# Patient Record
Sex: FEMALE | Race: BLACK OR AFRICAN AMERICAN | Employment: FULL TIME | URBAN - METROPOLITAN AREA
[De-identification: names, ages, dates, MRNs, and addresses within clinical notes are randomized per-mention and may not be internally consistent; named-entity substitution may affect disease eponyms.]

---

## 2022-03-05 ENCOUNTER — HOSPITAL ENCOUNTER (EMERGENCY)
Age: 57
Discharge: HOME | End: 2022-03-05
Attending: EMERGENCY MEDICINE
Payer: COMMERCIAL

## 2022-03-05 VITALS
OXYGEN SATURATION: 98 % | TEMPERATURE: 97.8 F | HEART RATE: 98 BPM | HEIGHT: 66 IN | WEIGHT: 220 LBS | SYSTOLIC BLOOD PRESSURE: 130 MMHG | RESPIRATION RATE: 18 BRPM | BODY MASS INDEX: 35.36 KG/M2 | DIASTOLIC BLOOD PRESSURE: 84 MMHG

## 2022-03-05 DIAGNOSIS — L02.91 ABSCESS: Primary | ICD-10-CM

## 2022-03-05 LAB
ALBUMIN SERPL-MCNC: 3.6 G/DL (ref 3.5–5)
ALBUMIN/GLOB SERPL: 0.8 {RATIO} (ref 1.2–3.5)
ALP SERPL-CCNC: 95 U/L (ref 50–130)
ALT SERPL-CCNC: 28 U/L (ref 12–65)
ANION GAP SERPL CALC-SCNC: 6 MMOL/L (ref 7–16)
AST SERPL-CCNC: 18 U/L (ref 15–37)
BASOPHILS # BLD: 0 K/UL (ref 0–0.2)
BASOPHILS NFR BLD: 0 % (ref 0–2)
BILIRUB SERPL-MCNC: 0.5 MG/DL (ref 0.2–1.1)
BUN SERPL-MCNC: 15 MG/DL (ref 6–23)
CALCIUM SERPL-MCNC: 9.6 MG/DL (ref 8.3–10.4)
CHLORIDE SERPL-SCNC: 107 MMOL/L (ref 98–107)
CO2 SERPL-SCNC: 27 MMOL/L (ref 21–32)
CREAT SERPL-MCNC: 0.88 MG/DL (ref 0.6–1)
DIFFERENTIAL METHOD BLD: ABNORMAL
EOSINOPHIL # BLD: 0.1 K/UL (ref 0–0.8)
EOSINOPHIL NFR BLD: 2 % (ref 0.5–7.8)
ERYTHROCYTE [DISTWIDTH] IN BLOOD BY AUTOMATED COUNT: 13.2 % (ref 11.9–14.6)
GLOBULIN SER CALC-MCNC: 4.8 G/DL (ref 2.3–3.5)
GLUCOSE SERPL-MCNC: 98 MG/DL (ref 65–100)
HCT VFR BLD AUTO: 38.5 % (ref 35.8–46.3)
HGB BLD-MCNC: 12.1 G/DL (ref 11.7–15.4)
IMM GRANULOCYTES # BLD AUTO: 0 K/UL (ref 0–0.5)
IMM GRANULOCYTES NFR BLD AUTO: 0 % (ref 0–5)
LYMPHOCYTES # BLD: 1.8 K/UL (ref 0.5–4.6)
LYMPHOCYTES NFR BLD: 20 % (ref 13–44)
MCH RBC QN AUTO: 25.4 PG (ref 26.1–32.9)
MCHC RBC AUTO-ENTMCNC: 31.4 G/DL (ref 31.4–35)
MCV RBC AUTO: 80.9 FL (ref 79.6–97.8)
MONOCYTES # BLD: 0.7 K/UL (ref 0.1–1.3)
MONOCYTES NFR BLD: 7 % (ref 4–12)
NEUTS SEG # BLD: 6.3 K/UL (ref 1.7–8.2)
NEUTS SEG NFR BLD: 70 % (ref 43–78)
NRBC # BLD: 0 K/UL (ref 0–0.2)
PLATELET # BLD AUTO: 292 K/UL (ref 150–450)
PMV BLD AUTO: 8.5 FL (ref 9.4–12.3)
POTASSIUM SERPL-SCNC: 3.4 MMOL/L (ref 3.5–5.1)
PROT SERPL-MCNC: 8.4 G/DL (ref 6.3–8.2)
RBC # BLD AUTO: 4.76 M/UL (ref 4.05–5.2)
SODIUM SERPL-SCNC: 140 MMOL/L (ref 136–145)
WBC # BLD AUTO: 8.9 K/UL (ref 4.3–11.1)

## 2022-03-05 PROCEDURE — 96365 THER/PROPH/DIAG IV INF INIT: CPT

## 2022-03-05 PROCEDURE — 99284 EMERGENCY DEPT VISIT MOD MDM: CPT

## 2022-03-05 PROCEDURE — 96375 TX/PRO/DX INJ NEW DRUG ADDON: CPT

## 2022-03-05 PROCEDURE — 74011250636 HC RX REV CODE- 250/636: Performed by: PHYSICIAN ASSISTANT

## 2022-03-05 PROCEDURE — 87205 SMEAR GRAM STAIN: CPT

## 2022-03-05 PROCEDURE — 80053 COMPREHEN METABOLIC PANEL: CPT

## 2022-03-05 PROCEDURE — 75810000289 HC I&D ABSCESS SIMP/COMP/MULT

## 2022-03-05 PROCEDURE — 85025 COMPLETE CBC W/AUTO DIFF WBC: CPT

## 2022-03-05 RX ORDER — CLINDAMYCIN HYDROCHLORIDE 300 MG/1
300 CAPSULE ORAL 4 TIMES DAILY
Qty: 40 CAPSULE | Refills: 0 | Status: SHIPPED | OUTPATIENT
Start: 2022-03-05 | End: 2022-03-15

## 2022-03-05 RX ORDER — KETOROLAC TROMETHAMINE 30 MG/ML
30 INJECTION, SOLUTION INTRAMUSCULAR; INTRAVENOUS
Status: COMPLETED | OUTPATIENT
Start: 2022-03-05 | End: 2022-03-05

## 2022-03-05 RX ORDER — DOXYCYCLINE 100 MG/1
100 CAPSULE ORAL 2 TIMES DAILY
COMMUNITY

## 2022-03-05 RX ORDER — CLINDAMYCIN PHOSPHATE 900 MG/50ML
900 INJECTION INTRAVENOUS
Status: COMPLETED | OUTPATIENT
Start: 2022-03-05 | End: 2022-03-05

## 2022-03-05 RX ORDER — AMLODIPINE BESYLATE 5 MG/1
5 TABLET ORAL DAILY
COMMUNITY

## 2022-03-05 RX ADMIN — CLINDAMYCIN PHOSPHATE 900 MG: 900 INJECTION, SOLUTION INTRAVENOUS at 13:43

## 2022-03-05 RX ADMIN — KETOROLAC TROMETHAMINE 30 MG: 30 INJECTION, SOLUTION INTRAMUSCULAR; INTRAVENOUS at 13:42

## 2022-03-05 NOTE — Clinical Note
[id number] [address] EMERGENCY DEPT  [address] [address] [phone]    Work/School Note    Date: 3/5/2022    To Whom It May concern:    Shama Weir was seen and treated today in the emergency room by the following provider(s):  Attending Provider: Ayana High MD  Physician Assistant: SHERIN Nunez. Shama Weir is excused from work/school on 3/5/2022 through 3/7/2022. She is medically clear to return to work/school on 3/8/2022.          Sincerely,          Debby Adamson

## 2022-03-05 NOTE — Clinical Note
[facility] EMERGENCY DEPT  [address] [address] [phone]    Work/School Note    Date: 3/5/2022    To Whom It May concern:    Sean Fine was seen and treated today in the emergency room by the following provider(s):  Attending Provider: Gloria Suarez MD  Physician Assistant: SHERIN Naranjo. Sean Fine is excused from work/school on 3/5/2022 through 3/7/2022. She is medically clear to return to work/school on 3/8/2022.          Sincerely,          SHERIN Marinelli

## 2022-03-05 NOTE — DISCHARGE INSTRUCTIONS
Wash two-three times daily with soap and water, blot dry, apply neosporin and a clean dressing. Watch for redness, swelling, pus, increasing pain, fever and return if any of those symptoms begin. Finish all of the antibiotics. Return to the ED in two days for packing removal and sooner if worse.

## 2022-03-05 NOTE — ED NOTES
I have reviewed discharge instructions with the patient. The patient verbalized understanding. Patient left ED via Discharge Method: ambulatory to Home with self. Opportunity for questions and clarification provided. Patient given 1 scripts. To continue your aftercare when you leave the hospital, you may receive an automated call from our care team to check in on how you are doing. This is a free service and part of our promise to provide the best care and service to meet your aftercare needs.  If you have questions, or wish to unsubscribe from this service please call [phone]. Thank you for Choosing our [facility] Emergency Department.

## 2022-03-05 NOTE — ED NOTES
65 yo female with erythema and swelling anterior to left ear starting 6 days ago. Swelling now to left side of face and superior to ear. Started doxy 3 days ago. Erythema and edema superior and anterior to left ear. Afebrile. Patient evaluated initially in triage. Rapid Medical Evaluation was conducted and necessary orders have been placed. I have performed a medical screening exam.  Care will now be transferred to the provider in the back of the emergency department.   SHERIN Miranda 12:34 PM

## 2022-03-05 NOTE — ED PROVIDER NOTES
Patient started on Tuesday, 4 days ago with swelling and pain to the area in front of and on top of her left ear. He has a history of abscesses in this area since she was a little girl. It happens on both ears. She tried to \"pop it\" without relief. No fevers. She is not a diabetic. She saw her primary care physician on Thursday, 2 days ago who started her on doxycycline and it has gotten worse. No nausea, vomiting, difficulty swallowing, chest pain, shortness of breath, abdominal pain, dizziness, weakness, dyspnea on exertion, orthopnea, swelling/tingling or weakness to her arms or legs or other new symptoms. She did ambulate to the room without difficulty and is well-hydrated. The history is provided by the patient. Skin Infection  This is a new problem. The current episode started more than 2 days ago. The problem occurs constantly. The problem has been gradually worsening. Pertinent negatives include no chest pain, no abdominal pain, no headaches and no shortness of breath. Nothing aggravates the symptoms. Nothing relieves the symptoms. She has tried nothing for the symptoms. Past Medical History:   Diagnosis Date    Abscess     Hypertension        Past Surgical History:   Procedure Laterality Date    HX ORTHOPAEDIC           History reviewed. No pertinent family history.     Social History     Socioeconomic History    Marital status: LEGALLY      Spouse name: Not on file    Number of children: Not on file    Years of education: Not on file    Highest education level: Not on file   Occupational History    Not on file   Tobacco Use    Smoking status: Never Smoker    Smokeless tobacco: Never Used   Substance and Sexual Activity    Alcohol use: Never    Drug use: Never    Sexual activity: Not on file   Other Topics Concern    Caffeine Concern Not Asked    Back Care Not Asked    Exercise Not Asked    Occupational Exposure Not Asked    Sleep Concern Not Asked    Stress Concern Not Asked    Weight Concern Not Asked   Social History Narrative    Not on file     Social Determinants of Health     Financial Resource Strain:     Difficulty of Paying Living Expenses: Not on file   Food Insecurity:     Worried About Running Out of Food in the Last Year: Not on file    Leslie of Food in the Last Year: Not on file   Transportation Needs:     Lack of Transportation (Medical): Not on file    Lack of Transportation (Non-Medical): Not on file   Physical Activity:     Days of Exercise per Week: Not on file    Minutes of Exercise per Session: Not on file   Stress:     Feeling of Stress : Not on file   Social Connections:     Frequency of Communication with Friends and Family: Not on file    Frequency of Social Gatherings with Friends and Family: Not on file    Attends Religion Services: Not on file    Active Member of [address] [place] [address] [employer] or Organizations: Not on file    Attends Club or Organization Meetings: Not on file    Marital Status: Not on file   Intimate Partner Violence:     Fear of Current or Ex-Partner: Not on file    Emotionally Abused: Not on file    Physically Abused: Not on file    Sexually Abused: Not on file   Housing Stability:     Unable to Pay for Housing in the Last Year: Not on file    Number of [place] in the Last Year: Not on file    Unstable Housing in the Last Year: Not on file         ALLERGIES: Patient has no known allergies. Review of Systems   Constitutional: Negative. HENT: Negative. Eyes: Negative. Respiratory: Negative. Negative for shortness of breath. Cardiovascular: Negative. Negative for chest pain. Gastrointestinal: Negative. Negative for abdominal pain. Genitourinary: Negative. Musculoskeletal: Negative. Skin: Positive for color change and wound. Neurological: Negative. Negative for headaches. Psychiatric/Behavioral: Negative. All other systems reviewed and are negative.       Vitals:    03/05/22 1231   BP: 130/84   Pulse: 98   Resp: 18   Temp: 97.8 °F (36.6 °C)   SpO2: 98%   Weight: 99.8 kg (220 lb)   Height: 5' 6\" (1.676 m)            Physical Exam  Vitals and nursing note reviewed. Constitutional:       Appearance: She is well-developed. HENT:      Head: Normocephalic and atraumatic. Right Ear: Hearing, tympanic membrane, ear canal and external ear normal.      Left Ear: Hearing, tympanic membrane, ear canal and external ear normal.      Ears:        Nose: Nose normal.      Mouth/Throat:      Mouth: Mucous membranes are moist.   Eyes:      Extraocular Movements: Extraocular movements intact. Conjunctiva/sclera: Conjunctivae normal.      Pupils: Pupils are equal, round, and reactive to light. Cardiovascular:      Rate and Rhythm: Normal rate. Pulses: Normal pulses. Pulmonary:      Effort: Pulmonary effort is normal.   Abdominal:      General: Abdomen is flat. Palpations: Abdomen is soft. Musculoskeletal:         General: Normal range of motion. Cervical back: Normal range of motion and neck supple. Skin:     General: Skin is warm and dry. Capillary Refill: Capillary refill takes less than 2 seconds. Findings: Erythema present. Neurological:      General: No focal deficit present. Mental Status: She is alert and oriented to person, place, and time. Mental status is at baseline. Deep Tendon Reflexes: Reflexes are normal and symmetric. Psychiatric:         Mood and Affect: Mood normal.         Behavior: Behavior normal.         Thought Content:  Thought content normal.         Judgment: Judgment normal.          MDM  Number of Diagnoses or Management Options  Risk of Complications, Morbidity, and/or Mortality  Presenting problems: moderate  Diagnostic procedures: moderate  Management options: moderate    Patient Progress  Patient progress: improved         I&D Abcess Simple    Date/Time: 3/5/2022 3:14 PM  Performed by: SHERIN Mayorga  Authorized by: SHERIN Chairez     Consent:     Consent obtained:  Verbal    Consent given by:  Patient    Risks discussed:  Bleeding, incomplete drainage, pain, damage to other organs and infection    Alternatives discussed:  No treatment, delayed treatment, alternative treatment, observation and referral  Location:     Type:  Abscess    Size:  2 x 2 centimeter abscess    Location:  Head    Head/neck location: Above the left ear. Pre-procedure details:     Skin preparation:  Betadine  Anesthesia (see MAR for exact dosages): Anesthesia method:  Local infiltration    Local anesthetic:  Lidocaine 1% WITH epi  Procedure type:     Complexity:  Simple  Procedure details:     Incision types:  Single straight    Scalpel blade:  11    Wound management:  Probed and deloculated    Drainage:  Purulent    Drainage amount: Moderate    Wound treatment:  Wound left open    Packing materials:  1/2 in iodoform gauze  Post-procedure details:     Patient tolerance of procedure: Tolerated well, no immediate complications        The patient was observed in the ED. Results Reviewed:      Recent Results (from the past 24 hour(s))   CBC WITH AUTOMATED DIFF    Collection Time: 03/05/22  1:16 PM   Result Value Ref Range    WBC 8.9 4.3 - 11.1 K/uL    RBC 4.76 4.05 - 5.2 M/uL    HGB 12.1 11.7 - 15.4 g/dL    HCT 38.5 35.8 - 46.3 %    MCV 80.9 79.6 - 97.8 FL    MCH 25.4 (L) 26.1 - 32.9 PG    MCHC 31.4 31.4 - 35.0 g/dL    RDW 13.2 11.9 - 14.6 %    PLATELET 871 248 - 631 K/uL    MPV 8.5 (L) 9.4 - 12.3 FL    ABSOLUTE NRBC 0.00 0.0 - 0.2 K/uL    DF AUTOMATED      NEUTROPHILS 70 43 - 78 %    LYMPHOCYTES 20 13 - 44 %    MONOCYTES 7 4.0 - 12.0 %    EOSINOPHILS 2 0.5 - 7.8 %    BASOPHILS 0 0.0 - 2.0 %    IMMATURE GRANULOCYTES 0 0.0 - 5.0 %    ABS. NEUTROPHILS 6.3 1.7 - 8.2 K/UL    ABS. LYMPHOCYTES 1.8 0.5 - 4.6 K/UL    ABS. MONOCYTES 0.7 0.1 - 1.3 K/UL    ABS. EOSINOPHILS 0.1 0.0 - 0.8 K/UL    ABS. BASOPHILS 0.0 0.0 - 0.2 K/UL    ABS. IMM. GRANS. 0.0 0.0 - 0.5 K/UL   METABOLIC PANEL, COMPREHENSIVE    Collection Time: 03/05/22  1:16 PM   Result Value Ref Range    Sodium 140 136 - 145 mmol/L    Potassium 3.4 (L) 3.5 - 5.1 mmol/L    Chloride 107 98 - 107 mmol/L    CO2 27 21 - 32 mmol/L    Anion gap 6 (L) 7 - 16 mmol/L    Glucose 98 65 - 100 mg/dL    BUN 15 6 - 23 MG/DL    Creatinine 0.88 0.6 - 1.0 MG/DL    GFR est AA >60 >60 ml/min/1.73m2    GFR est non-AA >60 >60 ml/min/1.73m2    Calcium 9.6 8.3 - 10.4 MG/DL    Bilirubin, total 0.5 0.2 - 1.1 MG/DL    ALT (SGPT) 28 12 - 65 U/L    AST (SGOT) 18 15 - 37 U/L    Alk. phosphatase 95 50 - 130 U/L    Protein, total 8.4 (H) 6.3 - 8.2 g/dL    Albumin 3.6 3.5 - 5.0 g/dL    Globulin 4.8 (H) 2.3 - 3.5 g/dL    A-G Ratio 0.8 (L) 1.2 - 3.5       I have given patient IV clindamycin here and will send her home with clindamycin by mouth. She is to stop the doxycycline. It does not appear that it was working. The wound was getting worse. She was instructed on symptomatic care. Wash two-three times daily with soap and water, blot dry, apply neosporin and a clean dressing. Watch for redness, swelling, pus, increasing pain, fever and return if any of those symptoms begin. Finish all of the antibiotics. Return to the ED in 2 days for packing removal and sooner if worse. Patient is stable for discharge and ambulatory out of the ED without difficulty. I discussed the results of all labs, procedures, radiographs, and treatments with the patient and available family. Treatment plan is agreed upon and the patient is ready for discharge. All voiced understanding of the discharge plan and medication instructions or changes as appropriate. Questions about treatment in the ED were answered. All were encouraged to return should symptoms worsen or new problems develop.

## 2022-03-05 NOTE — ED TRIAGE NOTES
Presents with abscess to left ear with swelling and redness extending under left eye and left side of face. Placed on doxycycline by PCP. Reports no improvement.

## 2022-03-08 LAB
BACTERIA SPEC CULT: NORMAL
GRAM STN SPEC: NORMAL
GRAM STN SPEC: NORMAL
SERVICE CMNT-IMP: NORMAL

## 2022-11-06 ENCOUNTER — HOSPITAL ENCOUNTER (EMERGENCY)
Age: 57
Discharge: HOME | End: 2022-11-06
Attending: EMERGENCY MEDICINE
Payer: COMMERCIAL

## 2022-11-06 ENCOUNTER — APPOINTMENT (OUTPATIENT)
Dept: GENERAL RADIOLOGY | Age: 57
End: 2022-11-06
Payer: COMMERCIAL

## 2022-11-06 VITALS
OXYGEN SATURATION: 98 % | SYSTOLIC BLOOD PRESSURE: 158 MMHG | TEMPERATURE: 99.5 F | DIASTOLIC BLOOD PRESSURE: 95 MMHG | RESPIRATION RATE: 16 BRPM | HEART RATE: 89 BPM

## 2022-11-06 DIAGNOSIS — M75.32 CALCIFIC TENDINITIS OF LEFT SHOULDER: Primary | ICD-10-CM

## 2022-11-06 PROCEDURE — 73030 X-RAY EXAM OF SHOULDER: CPT

## 2022-11-06 PROCEDURE — 6360000002 HC RX W HCPCS: Performed by: EMERGENCY MEDICINE

## 2022-11-06 PROCEDURE — 99284 EMERGENCY DEPT VISIT MOD MDM: CPT

## 2022-11-06 PROCEDURE — 96374 THER/PROPH/DIAG INJ IV PUSH: CPT

## 2022-11-06 RX ORDER — METHYLPREDNISOLONE 4 MG/1
TABLET ORAL
Qty: 1 KIT | Refills: 0 | Status: SHIPPED | OUTPATIENT
Start: 2022-11-06

## 2022-11-06 RX ORDER — HYDROCODONE BITARTRATE AND ACETAMINOPHEN 5; 325 MG/1; MG/1
1 TABLET ORAL EVERY 8 HOURS PRN
Qty: 9 TABLET | Refills: 0 | Status: SHIPPED | OUTPATIENT
Start: 2022-11-06 | End: 2022-11-09

## 2022-11-06 RX ORDER — METHYLPREDNISOLONE SODIUM SUCCINATE 125 MG/2ML
125 INJECTION, POWDER, LYOPHILIZED, FOR SOLUTION INTRAMUSCULAR; INTRAVENOUS DAILY
Status: DISCONTINUED | OUTPATIENT
Start: 2022-11-06 | End: 2022-11-06 | Stop reason: HOSPADM

## 2022-11-06 RX ADMIN — METHYLPREDNISOLONE SODIUM SUCCINATE 125 MG: 125 INJECTION, POWDER, FOR SOLUTION INTRAMUSCULAR; INTRAVENOUS at 19:48

## 2022-11-06 ASSESSMENT — PAIN DESCRIPTION - DESCRIPTORS: DESCRIPTORS: SHOOTING

## 2022-11-06 ASSESSMENT — PAIN DESCRIPTION - LOCATION: LOCATION: SHOULDER

## 2022-11-06 ASSESSMENT — PAIN SCALES - GENERAL
PAINLEVEL_OUTOF10: 6
PAINLEVEL_OUTOF10: 10

## 2022-11-06 ASSESSMENT — PAIN DESCRIPTION - ORIENTATION: ORIENTATION: LEFT

## 2022-11-06 ASSESSMENT — PAIN - FUNCTIONAL ASSESSMENT: PAIN_FUNCTIONAL_ASSESSMENT: 0-10

## 2022-11-06 NOTE — ED TRIAGE NOTES
Patient has left shoulder pain since yesterday. Patient has applied ice (some relief), taken pain meds without relief. Patient has limited mobility in the left arm. Patient cannot  my fingers equally due to pain.

## 2022-11-07 NOTE — ED PROVIDER NOTES
Emergency Department Provider Note                   PCP:                Aryan Phan MD               Age: 62 y.o. Sex: female       ICD-10-CM    1. Calcific tendinitis of left shoulder  M75.32 HYDROcodone-acetaminophen (NORCO) 5-325 MG per tablet          DISPOSITION Decision To Discharge 11/06/2022 07:30:56 PM       MDM  Number of Diagnoses or Management Options  Diagnosis management comments: Sprain, strain, tendon injury, contusion,    Abrasion, laceration, neurovascular injury, foreign body    Fracture, open fracture, dislocation, joint separation, articular surface injury,         Amount and/or Complexity of Data Reviewed  Tests in the radiology section of CPT®: ordered and reviewed  Tests in the medicine section of CPT®: reviewed and ordered  Review and summarize past medical records: yes  Independent visualization of images, tracings, or specimens: yes         ED Course as of 11/06/22 1932   Sun Nov 06, 2022   1908 XR SHOULDER LEFT (MIN 2 VIEWS)  IMPRESSION:  No acute findings. Findings do suggest underlying calcific  tendinitis. [KH]      ED Course User Index  [KH] Ilya Hogan DO        Orders Placed This Encounter   Procedures    XR SHOULDER LEFT (MIN 2 VIEWS)        Medications   methylPREDNISolone sodium (SOLU-MEDROL) injection 125 mg (has no administration in time range)       New Prescriptions    HYDROCODONE-ACETAMINOPHEN (NORCO) 5-325 MG PER TABLET    Take 1 tablet by mouth every 8 hours as needed for Pain for up to 3 days. METHYLPREDNISOLONE (MEDROL DOSEPACK) 4 MG TABLET    Take by mouth. Wesley Donald is a 62 y.o. female who presents to the Emergency Department with chief complaint of  No chief complaint on file. Patient is a 59-year-old female presenting to the emergency department today complaining of left shoulder pain. The patient states it has been aching for a few days when she got up this morning it was severe. The patient denies any trauma or injury.   She or warmth over the glenohumeral joint. NEUROLOGIC: Cranial nerve exam reveals face is symmetrical, tongue is midline  speech is clear. No focal deficits noted  SKIN: No rash or petechiae. Good skin turgor palpated. PSYCHIATRIC: Alert and oriented. Appropriate behavior and judgment. Procedures    Results for orders placed or performed during the hospital encounter of 11/06/22   XR SHOULDER LEFT (MIN 2 VIEWS)    Narrative    History: Left shoulder pain, one day duration    EXAM: Multiple views left shoulder    FINDINGS: There is a large amorphous focus of calcification seen adjacent to the  greater tuberosity, likely representing calcific tendinitis. There is  osteopenia. There is AC joint arthrosis. No definite acute fracture or  dislocation. The included left lung is clear. Impression    No acute findings. Findings do suggest underlying calcific  tendinitis. XR SHOULDER LEFT (MIN 2 VIEWS)   Final Result   No acute findings. Findings do suggest underlying calcific   tendinitis. Voice dictation software was used during the making of this note. This software is not perfect and grammatical and other typographical errors may be present. This note has not been completely proofread for errors.        Lin Pacheco,   11/06/22 1932

## 2022-11-07 NOTE — ED NOTES
Pt presents to the ED for c/o left shoulder pain for several days.  Pt having difficulty raising her left arm above her head     oRnald Georges RN  11/06/22 9575

## 2022-11-07 NOTE — ED NOTES
I have reviewed discharge instructions with the patient. The patient verbalized understanding. Patient left ED via Discharge Method: ambulatory to Home with (self,). Opportunity for questions and clarification provided. Patient given 2 scripts. To continue your aftercare when you leave the hospital, you may receive an automated call from our care team to check in on how you are doing. This is a free service and part of our promise to provide the best care and service to meet your aftercare needs.  If you have questions, or wish to unsubscribe from this service please call [phone]. Thank you for Choosing our [facility] Emergency Department.        Hiwot Varner RN  11/06/22 2023

## 2025-01-01 ENCOUNTER — APPOINTMENT (OUTPATIENT)
Dept: GENERAL RADIOLOGY | Age: 60
DRG: 871 | End: 2025-01-01
Attending: INTERNAL MEDICINE
Payer: COMMERCIAL

## 2025-01-01 ENCOUNTER — APPOINTMENT (OUTPATIENT)
Dept: CT IMAGING | Age: 60
End: 2025-01-01
Payer: COMMERCIAL

## 2025-01-01 ENCOUNTER — HOSPITAL ENCOUNTER (EMERGENCY)
Age: 60
Discharge: ANOTHER HOSPITAL | End: 2025-07-17
Attending: EMERGENCY MEDICINE | Admitting: FAMILY MEDICINE
Payer: COMMERCIAL

## 2025-01-01 ENCOUNTER — APPOINTMENT (OUTPATIENT)
Dept: GENERAL RADIOLOGY | Age: 60
End: 2025-01-01
Payer: COMMERCIAL

## 2025-01-01 ENCOUNTER — HOSPITAL ENCOUNTER (INPATIENT)
Age: 60
LOS: 5 days | DRG: 871 | End: 2025-07-22
Attending: INTERNAL MEDICINE
Payer: COMMERCIAL

## 2025-01-01 VITALS
SYSTOLIC BLOOD PRESSURE: 131 MMHG | DIASTOLIC BLOOD PRESSURE: 98 MMHG | HEART RATE: 129 BPM | HEIGHT: 66 IN | TEMPERATURE: 99 F | OXYGEN SATURATION: 92 % | RESPIRATION RATE: 20 BRPM | BODY MASS INDEX: 29.73 KG/M2 | WEIGHT: 185 LBS

## 2025-01-01 VITALS
HEIGHT: 66 IN | WEIGHT: 181.44 LBS | BODY MASS INDEX: 29.16 KG/M2 | DIASTOLIC BLOOD PRESSURE: 69 MMHG | SYSTOLIC BLOOD PRESSURE: 109 MMHG | OXYGEN SATURATION: 6 % | TEMPERATURE: 97.2 F

## 2025-01-01 DIAGNOSIS — J18.9 HEALTHCARE-ASSOCIATED PNEUMONIA: Primary | ICD-10-CM

## 2025-01-01 DIAGNOSIS — R06.03 RESPIRATORY DISTRESS: ICD-10-CM

## 2025-01-01 DIAGNOSIS — J96.01 ACUTE RESPIRATORY FAILURE WITH HYPOXIA (HCC): ICD-10-CM

## 2025-01-01 LAB
ALBUMIN SERPL-MCNC: 1.8 G/DL (ref 3.5–5)
ALBUMIN/GLOB SERPL: 0.3 (ref 1–1.9)
ALP SERPL-CCNC: 110 U/L (ref 35–104)
ALT SERPL-CCNC: 38 U/L (ref 8–45)
AMORPH CRY URNS QL MICRO: ABNORMAL
ANION GAP SERPL CALC-SCNC: 12 MMOL/L (ref 7–16)
ANION GAP SERPL CALC-SCNC: 13 MMOL/L (ref 7–16)
ANION GAP SERPL CALC-SCNC: 15 MMOL/L (ref 7–16)
ANION GAP SERPL CALC-SCNC: 15 MMOL/L (ref 7–16)
APPEARANCE UR: ABNORMAL
APPEARANCE UR: CLEAR
ARTERIAL PATENCY WRIST A: POSITIVE
AST SERPL-CCNC: 39 U/L (ref 15–37)
B PERT DNA SPEC QL NAA+PROBE: NOT DETECTED
BACTERIA SPEC CULT: NORMAL
BACTERIA URNS QL MICRO: ABNORMAL /HPF
BACTERIA URNS QL MICRO: NEGATIVE /HPF
BASE DEFICIT BLD-SCNC: 0.9 MMOL/L
BASE DEFICIT BLD-SCNC: 2 MMOL/L
BASE DEFICIT BLD-SCNC: 2.8 MMOL/L
BASE EXCESS BLD CALC-SCNC: 1.9 MMOL/L
BASE EXCESS BLD CALC-SCNC: 2.4 MMOL/L
BASE EXCESS BLD CALC-SCNC: 3.3 MMOL/L
BASE EXCESS BLD CALC-SCNC: 4.4 MMOL/L
BASE EXCESS BLD CALC-SCNC: 4.6 MMOL/L
BASE EXCESS BLD CALC-SCNC: 5.7 MMOL/L
BASE EXCESS BLD CALC-SCNC: 7.8 MMOL/L
BASE EXCESS BLDV CALC-SCNC: 4.9 MMOL/L
BASOPHILS # BLD: 0.03 K/UL (ref 0–0.2)
BASOPHILS # BLD: 0.04 K/UL (ref 0–0.2)
BASOPHILS # BLD: 0.05 K/UL (ref 0–0.2)
BASOPHILS # BLD: 0.06 K/UL (ref 0–0.2)
BASOPHILS # BLD: 0.07 K/UL (ref 0–0.2)
BASOPHILS # BLD: 0.08 K/UL (ref 0–0.2)
BASOPHILS NFR BLD: 0.1 % (ref 0–2)
BASOPHILS NFR BLD: 0.2 % (ref 0–2)
BASOPHILS NFR BLD: 0.2 % (ref 0–2)
BASOPHILS NFR BLD: 0.3 % (ref 0–2)
BDY SITE: ABNORMAL
BILIRUB SERPL-MCNC: 0.4 MG/DL (ref 0–1.2)
BILIRUB UR QL: ABNORMAL
BILIRUB UR QL: NEGATIVE
BORDETELLA PARAPERTUSSIS BY PCR: NOT DETECTED
BUN SERPL-MCNC: 15 MG/DL (ref 8–23)
BUN SERPL-MCNC: 21 MG/DL (ref 6–23)
BUN SERPL-MCNC: 22 MG/DL (ref 8–23)
BUN SERPL-MCNC: 29 MG/DL (ref 8–23)
BUN SERPL-MCNC: 39 MG/DL (ref 8–23)
BUN SERPL-MCNC: 42 MG/DL (ref 8–23)
C PNEUM DNA SPEC QL NAA+PROBE: NOT DETECTED
CALCIUM SERPL-MCNC: 8.8 MG/DL (ref 8.8–10.2)
CALCIUM SERPL-MCNC: 9.2 MG/DL (ref 8.8–10.2)
CALCIUM SERPL-MCNC: 9.3 MG/DL (ref 8.8–10.2)
CALCIUM SERPL-MCNC: 9.3 MG/DL (ref 8.8–10.2)
CALCIUM SERPL-MCNC: 9.5 MG/DL (ref 8.8–10.2)
CALCIUM SERPL-MCNC: 9.7 MG/DL (ref 8.8–10.2)
CASTS URNS QL MICRO: 0 /LPF
CHLORIDE SERPL-SCNC: 92 MMOL/L (ref 98–107)
CHLORIDE SERPL-SCNC: 95 MMOL/L (ref 98–107)
CHLORIDE SERPL-SCNC: 96 MMOL/L (ref 98–107)
CHLORIDE SERPL-SCNC: 96 MMOL/L (ref 98–107)
CHLORIDE SERPL-SCNC: 98 MMOL/L (ref 98–107)
CHLORIDE SERPL-SCNC: 99 MMOL/L (ref 98–107)
CO2 SERPL-SCNC: 21 MMOL/L (ref 20–29)
CO2 SERPL-SCNC: 22 MMOL/L (ref 20–29)
CO2 SERPL-SCNC: 22 MMOL/L (ref 20–29)
CO2 SERPL-SCNC: 23 MMOL/L (ref 20–29)
CO2 SERPL-SCNC: 24 MMOL/L (ref 20–29)
CO2 SERPL-SCNC: 25 MMOL/L (ref 20–29)
COLOR UR: ABNORMAL
COLOR UR: ABNORMAL
CREAT SERPL-MCNC: 0.74 MG/DL (ref 0.6–1.1)
CREAT SERPL-MCNC: 0.8 MG/DL (ref 0.6–1.1)
CREAT SERPL-MCNC: 0.97 MG/DL (ref 0.6–1.1)
CREAT SERPL-MCNC: 1.07 MG/DL (ref 0.6–1.1)
CREAT SERPL-MCNC: 1.16 MG/DL (ref 0.6–1.1)
CREAT SERPL-MCNC: 1.17 MG/DL (ref 0.6–1.1)
CRYSTALS URNS QL MICRO: 0 /LPF
DIFFERENTIAL METHOD BLD: ABNORMAL
EKG ATRIAL RATE: 133 BPM
EKG DIAGNOSIS: NORMAL
EKG P AXIS: 120 DEGREES
EKG P-R INTERVAL: 125 MS
EKG Q-T INTERVAL: 314 MS
EKG QRS DURATION: 64 MS
EKG QTC CALCULATION (BAZETT): 466 MS
EKG R AXIS: 116 DEGREES
EKG T AXIS: 149 DEGREES
EKG VENTRICULAR RATE: 132 BPM
EOSINOPHIL # BLD: 0.02 K/UL (ref 0–0.8)
EOSINOPHIL # BLD: 0.03 K/UL (ref 0–0.8)
EOSINOPHIL # BLD: 0.09 K/UL (ref 0–0.8)
EOSINOPHIL # BLD: 0.11 K/UL (ref 0–0.8)
EOSINOPHIL # BLD: 0.13 K/UL (ref 0–0.8)
EOSINOPHIL # BLD: 0.17 K/UL (ref 0–0.8)
EOSINOPHIL NFR BLD: 0.1 % (ref 0.5–7.8)
EOSINOPHIL NFR BLD: 0.1 % (ref 0.5–7.8)
EOSINOPHIL NFR BLD: 0.4 % (ref 0.5–7.8)
EOSINOPHIL NFR BLD: 0.5 % (ref 0.5–7.8)
EOSINOPHIL NFR BLD: 0.6 % (ref 0.5–7.8)
EOSINOPHIL NFR BLD: 0.8 % (ref 0.5–7.8)
EPI CELLS #/AREA URNS HPF: ABNORMAL /HPF
EPI CELLS #/AREA URNS HPF: ABNORMAL /HPF
ERYTHROCYTE [DISTWIDTH] IN BLOOD BY AUTOMATED COUNT: 18.1 % (ref 11.9–14.6)
ERYTHROCYTE [DISTWIDTH] IN BLOOD BY AUTOMATED COUNT: 18.5 % (ref 11.9–14.6)
ERYTHROCYTE [DISTWIDTH] IN BLOOD BY AUTOMATED COUNT: 18.6 % (ref 11.9–14.6)
ERYTHROCYTE [DISTWIDTH] IN BLOOD BY AUTOMATED COUNT: 18.6 % (ref 11.9–14.6)
FLUAV SUBTYP SPEC NAA+PROBE: NOT DETECTED
FLUBV RNA SPEC QL NAA+PROBE: NOT DETECTED
GAS FLOW.O2 O2 DELIVERY SYS: ABNORMAL
GLOBULIN SER CALC-MCNC: 5.3 G/DL (ref 2.3–3.5)
GLUCOSE BLD STRIP.AUTO-MCNC: 148 MG/DL (ref 65–100)
GLUCOSE SERPL-MCNC: 125 MG/DL (ref 70–99)
GLUCOSE SERPL-MCNC: 127 MG/DL (ref 70–99)
GLUCOSE SERPL-MCNC: 145 MG/DL (ref 70–99)
GLUCOSE SERPL-MCNC: 154 MG/DL (ref 70–99)
GLUCOSE SERPL-MCNC: 155 MG/DL (ref 70–99)
GLUCOSE SERPL-MCNC: 183 MG/DL (ref 70–99)
GLUCOSE UR STRIP.AUTO-MCNC: NEGATIVE MG/DL
GLUCOSE UR STRIP.AUTO-MCNC: NEGATIVE MG/DL
HADV DNA SPEC QL NAA+PROBE: NOT DETECTED
HCO3 BLD-SCNC: 25.2 MMOL/L (ref 21–28)
HCO3 BLD-SCNC: 26.4 MMOL/L (ref 21–28)
HCO3 BLD-SCNC: 27.7 MMOL/L (ref 21–28)
HCO3 BLD-SCNC: 28.9 MMOL/L (ref 21–28)
HCO3 BLD-SCNC: 30.1 MMOL/L (ref 21–28)
HCO3 BLD-SCNC: 30.4 MMOL/L (ref 21–28)
HCO3 BLD-SCNC: 30.4 MMOL/L (ref 21–28)
HCO3 BLD-SCNC: 30.7 MMOL/L (ref 21–28)
HCO3 BLD-SCNC: 31.9 MMOL/L (ref 21–28)
HCO3 BLD-SCNC: 33.2 MMOL/L (ref 21–28)
HCO3 BLDV-SCNC: 29.7 MMOL/L (ref 22–29)
HCOV 229E RNA SPEC QL NAA+PROBE: NOT DETECTED
HCOV HKU1 RNA SPEC QL NAA+PROBE: NOT DETECTED
HCOV NL63 RNA SPEC QL NAA+PROBE: NOT DETECTED
HCOV OC43 RNA SPEC QL NAA+PROBE: NOT DETECTED
HCT VFR BLD AUTO: 31.4 % (ref 35.8–46.3)
HCT VFR BLD AUTO: 32.7 % (ref 35.8–46.3)
HCT VFR BLD AUTO: 34.1 % (ref 35.8–46.3)
HCT VFR BLD AUTO: 35.1 % (ref 35.8–46.3)
HCT VFR BLD AUTO: 36.1 % (ref 35.8–46.3)
HCT VFR BLD AUTO: 36.2 % (ref 35.8–46.3)
HGB BLD-MCNC: 10.1 G/DL (ref 11.7–15.4)
HGB BLD-MCNC: 10.3 G/DL (ref 11.7–15.4)
HGB BLD-MCNC: 10.5 G/DL (ref 11.7–15.4)
HGB BLD-MCNC: 10.9 G/DL (ref 11.7–15.4)
HGB BLD-MCNC: 11 G/DL (ref 11.7–15.4)
HGB BLD-MCNC: 9.4 G/DL (ref 11.7–15.4)
HGB UR QL STRIP: ABNORMAL
HGB UR QL STRIP: ABNORMAL
HMPV RNA SPEC QL NAA+PROBE: NOT DETECTED
HPIV1 RNA SPEC QL NAA+PROBE: NOT DETECTED
HPIV2 RNA SPEC QL NAA+PROBE: NOT DETECTED
HPIV3 RNA SPEC QL NAA+PROBE: NOT DETECTED
HPIV4 RNA SPEC QL NAA+PROBE: NOT DETECTED
HYALINE CASTS URNS QL MICRO: ABNORMAL /LPF
IMM GRANULOCYTES # BLD AUTO: 0.35 K/UL (ref 0–0.5)
IMM GRANULOCYTES # BLD AUTO: 0.49 K/UL (ref 0–0.5)
IMM GRANULOCYTES # BLD AUTO: 0.5 K/UL (ref 0–0.5)
IMM GRANULOCYTES # BLD AUTO: 0.5 K/UL (ref 0–0.5)
IMM GRANULOCYTES # BLD AUTO: 0.51 K/UL (ref 0–0.5)
IMM GRANULOCYTES # BLD AUTO: 0.56 K/UL (ref 0–0.5)
IMM GRANULOCYTES NFR BLD AUTO: 1.7 % (ref 0–5)
IMM GRANULOCYTES NFR BLD AUTO: 2.1 % (ref 0–5)
IMM GRANULOCYTES NFR BLD AUTO: 2.1 % (ref 0–5)
IMM GRANULOCYTES NFR BLD AUTO: 2.2 % (ref 0–5)
IMM GRANULOCYTES NFR BLD AUTO: 2.2 % (ref 0–5)
IMM GRANULOCYTES NFR BLD AUTO: 2.4 % (ref 0–5)
INSPIRATION.DURATION SETTING TIME VENT: 0.8 SEC
INSPIRATION.DURATION SETTING TIME VENT: 0.85 SEC
IPAP/PIP/HIGH PEEP: 33
IPAP/PIP/HIGH PEEP: 35
KETONES UR QL STRIP.AUTO: NEGATIVE MG/DL
KETONES UR QL STRIP.AUTO: NEGATIVE MG/DL
LACTATE SERPL-SCNC: 1.7 MMOL/L (ref 0.5–2)
LACTATE SERPL-SCNC: 2.2 MMOL/L (ref 0.5–2)
LACTATE SERPL-SCNC: 2.3 MMOL/L (ref 0.5–2)
LEUKOCYTE ESTERASE UR QL STRIP.AUTO: ABNORMAL
LEUKOCYTE ESTERASE UR QL STRIP.AUTO: ABNORMAL
LYMPHOCYTES # BLD: 0.4 K/UL (ref 0.5–4.6)
LYMPHOCYTES # BLD: 0.42 K/UL (ref 0.5–4.6)
LYMPHOCYTES # BLD: 0.57 K/UL (ref 0.5–4.6)
LYMPHOCYTES # BLD: 0.59 K/UL (ref 0.5–4.6)
LYMPHOCYTES # BLD: 0.66 K/UL (ref 0.5–4.6)
LYMPHOCYTES # BLD: 0.67 K/UL (ref 0.5–4.6)
LYMPHOCYTES NFR BLD: 1.7 % (ref 13–44)
LYMPHOCYTES NFR BLD: 1.9 % (ref 13–44)
LYMPHOCYTES NFR BLD: 2.4 % (ref 13–44)
LYMPHOCYTES NFR BLD: 2.5 % (ref 13–44)
LYMPHOCYTES NFR BLD: 2.9 % (ref 13–44)
LYMPHOCYTES NFR BLD: 3.1 % (ref 13–44)
M PNEUMO DNA SPEC QL NAA+PROBE: NOT DETECTED
MCH RBC QN AUTO: 25.6 PG (ref 26.1–32.9)
MCH RBC QN AUTO: 26.7 PG (ref 26.1–32.9)
MCH RBC QN AUTO: 26.8 PG (ref 26.1–32.9)
MCH RBC QN AUTO: 26.9 PG (ref 26.1–32.9)
MCH RBC QN AUTO: 26.9 PG (ref 26.1–32.9)
MCH RBC QN AUTO: 27.2 PG (ref 26.1–32.9)
MCHC RBC AUTO-ENTMCNC: 29.1 G/DL (ref 31.4–35)
MCHC RBC AUTO-ENTMCNC: 29.9 G/DL (ref 31.4–35)
MCHC RBC AUTO-ENTMCNC: 30.2 G/DL (ref 31.4–35)
MCHC RBC AUTO-ENTMCNC: 30.4 G/DL (ref 31.4–35)
MCHC RBC AUTO-ENTMCNC: 30.9 G/DL (ref 31.4–35)
MCHC RBC AUTO-ENTMCNC: 31.1 G/DL (ref 31.4–35)
MCV RBC AUTO: 82.6 FL (ref 82–102)
MCV RBC AUTO: 87 FL (ref 82–102)
MCV RBC AUTO: 88.5 FL (ref 82–102)
MCV RBC AUTO: 89.2 FL (ref 82–102)
MCV RBC AUTO: 90.2 FL (ref 82–102)
MCV RBC AUTO: 92.1 FL (ref 82–102)
MONOCYTES # BLD: 0.74 K/UL (ref 0.1–1.3)
MONOCYTES # BLD: 1.41 K/UL (ref 0.1–1.3)
MONOCYTES # BLD: 1.83 K/UL (ref 0.1–1.3)
MONOCYTES # BLD: 1.87 K/UL (ref 0.1–1.3)
MONOCYTES # BLD: 2.31 K/UL (ref 0.1–1.3)
MONOCYTES # BLD: 2.35 K/UL (ref 0.1–1.3)
MONOCYTES NFR BLD: 10 % (ref 4–12)
MONOCYTES NFR BLD: 10.8 % (ref 4–12)
MONOCYTES NFR BLD: 3.5 % (ref 4–12)
MONOCYTES NFR BLD: 5.9 % (ref 4–12)
MONOCYTES NFR BLD: 7.4 % (ref 4–12)
MONOCYTES NFR BLD: 8.1 % (ref 4–12)
MUCOUS THREADS URNS QL MICRO: 0 /LPF
NEUTS SEG # BLD: 17.72 K/UL (ref 1.7–8.2)
NEUTS SEG # BLD: 19.31 K/UL (ref 1.7–8.2)
NEUTS SEG # BLD: 19.32 K/UL (ref 1.7–8.2)
NEUTS SEG # BLD: 19.74 K/UL (ref 1.7–8.2)
NEUTS SEG # BLD: 21.25 K/UL (ref 1.7–8.2)
NEUTS SEG # BLD: 22.31 K/UL (ref 1.7–8.2)
NEUTS SEG NFR BLD: 82.6 % (ref 43–78)
NEUTS SEG NFR BLD: 84.5 % (ref 43–78)
NEUTS SEG NFR BLD: 86 % (ref 43–78)
NEUTS SEG NFR BLD: 88.4 % (ref 43–78)
NEUTS SEG NFR BLD: 89 % (ref 43–78)
NEUTS SEG NFR BLD: 92.7 % (ref 43–78)
NITRITE UR QL STRIP.AUTO: NEGATIVE
NITRITE UR QL STRIP.AUTO: NEGATIVE
NRBC # BLD: 0 K/UL (ref 0–0.2)
NRBC # BLD: 0.02 K/UL (ref 0–0.2)
NRBC # BLD: 0.02 K/UL (ref 0–0.2)
NRBC # BLD: 0.04 K/UL (ref 0–0.2)
NRBC # BLD: 0.05 K/UL (ref 0–0.2)
NRBC # BLD: 0.09 K/UL (ref 0–0.2)
O2/TOTAL GAS SETTING VFR VENT: 100 %
O2/TOTAL GAS SETTING VFR VENT: 60 %
O2/TOTAL GAS SETTING VFR VENT: 60 %
O2/TOTAL GAS SETTING VFR VENT: 70 %
O2/TOTAL GAS SETTING VFR VENT: 95 %
OTHER OBSERVATIONS: ABNORMAL
PAW @ MEAN EXP FLOW ON VENT: 23 CMH2O
PAW @ MEAN EXP FLOW ON VENT: 24 CMH2O
PAW @ MEAN EXP FLOW ON VENT: 25 CMH2O
PCO2 BLD: 45.9 MMHG (ref 35–45)
PCO2 BLD: 46.3 MMHG (ref 35–45)
PCO2 BLD: 47.3 MMHG (ref 35–45)
PCO2 BLD: 49.9 MMHG (ref 35–45)
PCO2 BLD: 50.6 MMHG (ref 35–45)
PCO2 BLD: 60.7 MMHG (ref 35–45)
PCO2 BLD: 61.7 MMHG (ref 35–45)
PCO2 BLD: 62.3 MMHG (ref 35–45)
PCO2 BLD: 63.3 MMHG (ref 35–45)
PCO2 BLD: 77.1 MMHG (ref 35–45)
PCO2 BLDV: 43.9 MMHG (ref 41–51)
PEEP RESPIRATORY: 10 CMH2O
PEEP RESPIRATORY: 8 CMH2O
PEEP RESPIRATORY: 9 CMH2O
PH BLD: 7.16 (ref 7.35–7.45)
PH BLD: 7.25 (ref 7.35–7.45)
PH BLD: 7.29 (ref 7.35–7.45)
PH BLD: 7.3 (ref 7.35–7.45)
PH BLD: 7.32 (ref 7.35–7.45)
PH BLD: 7.34 (ref 7.35–7.45)
PH BLD: 7.39 (ref 7.35–7.45)
PH BLD: 7.39 (ref 7.35–7.45)
PH BLD: 7.43 (ref 7.35–7.45)
PH BLD: 7.43 (ref 7.35–7.45)
PH BLDV: 7.44 (ref 7.32–7.42)
PH UR STRIP: 5 (ref 5–9)
PH UR STRIP: 7 (ref 5–9)
PLATELET # BLD AUTO: 177 K/UL (ref 150–450)
PLATELET # BLD AUTO: 199 K/UL (ref 150–450)
PLATELET # BLD AUTO: 219 K/UL (ref 150–450)
PLATELET # BLD AUTO: 222 K/UL (ref 150–450)
PLATELET # BLD AUTO: 251 K/UL (ref 150–450)
PLATELET # BLD AUTO: 318 K/UL (ref 150–450)
PMV BLD AUTO: 8 FL (ref 9.4–12.3)
PMV BLD AUTO: 8.8 FL (ref 9.4–12.3)
PMV BLD AUTO: 8.8 FL (ref 9.4–12.3)
PMV BLD AUTO: 9.2 FL (ref 9.4–12.3)
PMV BLD AUTO: 9.4 FL (ref 9.4–12.3)
PMV BLD AUTO: 9.8 FL (ref 9.4–12.3)
PO2 BLD: 44 MMHG (ref 75–100)
PO2 BLD: 48 MMHG (ref 75–100)
PO2 BLD: 51 MMHG (ref 75–100)
PO2 BLD: 54 MMHG (ref 75–100)
PO2 BLD: 54 MMHG (ref 75–100)
PO2 BLD: 62 MMHG (ref 75–100)
PO2 BLD: 66 MMHG (ref 75–100)
PO2 BLD: 66 MMHG (ref 75–100)
PO2 BLD: 70 MMHG (ref 75–100)
PO2 BLD: 71 MMHG (ref 75–100)
PO2 BLDV: 54 MMHG
POC FIO2: 10
POTASSIUM SERPL-SCNC: 4.3 MMOL/L (ref 3.5–5.1)
POTASSIUM SERPL-SCNC: 4.6 MMOL/L (ref 3.5–5.1)
POTASSIUM SERPL-SCNC: 4.6 MMOL/L (ref 3.5–5.1)
POTASSIUM SERPL-SCNC: 4.8 MMOL/L (ref 3.5–5.1)
POTASSIUM SERPL-SCNC: 4.9 MMOL/L (ref 3.5–5.1)
POTASSIUM SERPL-SCNC: 5.2 MMOL/L (ref 3.5–5.1)
PRESSURE SUPPORT SETTING VENT: 18 CMH2O
PROCALCITONIN SERPL-MCNC: 0.36 NG/ML (ref 0–0.1)
PROT SERPL-MCNC: 7.1 G/DL (ref 6.3–8.2)
PROT UR STRIP-MCNC: 100 MG/DL
PROT UR STRIP-MCNC: 30 MG/DL
RBC # BLD AUTO: 3.52 M/UL (ref 4.05–5.2)
RBC # BLD AUTO: 3.76 M/UL (ref 4.05–5.2)
RBC # BLD AUTO: 3.78 M/UL (ref 4.05–5.2)
RBC # BLD AUTO: 3.92 M/UL (ref 4.05–5.2)
RBC # BLD AUTO: 4.09 M/UL (ref 4.05–5.2)
RBC # BLD AUTO: 4.25 M/UL (ref 4.05–5.2)
RBC #/AREA URNS HPF: >100 /HPF
RBC #/AREA URNS HPF: ABNORMAL /HPF
RESPIRATORY RATE, POC: 18 (ref 5–40)
RESPIRATORY RATE, POC: 2 (ref 5–40)
RESPIRATORY RATE, POC: 24 (ref 5–40)
RESPIRATORY RATE, POC: 30 (ref 5–40)
RESPIRATORY RATE, POC: 30 (ref 5–40)
RSV RNA SPEC QL NAA+PROBE: NOT DETECTED
RV+EV RNA SPEC QL NAA+PROBE: NOT DETECTED
SAO2 % BLD: 80.8 % (ref 94–98)
SAO2 % BLD: 83.4 % (ref 94–98)
SAO2 % BLD: 83.9 % (ref 94–98)
SAO2 % BLD: 84.5 % (ref 94–98)
SAO2 % BLD: 86.9 % (ref 94–98)
SAO2 % BLD: 87.5 % (ref 94–98)
SAO2 % BLD: 89.4 % (ref 94–98)
SAO2 % BLD: 89.7 % (ref 94–98)
SAO2 % BLD: 89.9 % (ref 94–98)
SAO2 % BLD: 90.5 % (ref 94–98)
SAO2 % BLDV: 88.2 % (ref 65–88)
SARS-COV-2 RDRP RESP QL NAA+PROBE: NOT DETECTED
SARS-COV-2 RNA RESP QL NAA+PROBE: NOT DETECTED
SERVICE CMNT-IMP: ABNORMAL
SERVICE CMNT-IMP: NORMAL
SODIUM SERPL-SCNC: 131 MMOL/L (ref 136–145)
SODIUM SERPL-SCNC: 132 MMOL/L (ref 136–145)
SODIUM SERPL-SCNC: 133 MMOL/L (ref 136–145)
SOURCE: NORMAL
SP GR UR REFRACTOMETRY: 1.03 (ref 1–1.02)
SP GR UR REFRACTOMETRY: 1.03 (ref 1–1.02)
SPECIMEN TYPE: ABNORMAL
URINE CULTURE IF INDICATED: ABNORMAL
UROBILINOGEN UR QL STRIP.AUTO: 0.2 EU/DL (ref 0.2–1)
UROBILINOGEN UR QL STRIP.AUTO: 0.2 EU/DL (ref 0.2–1)
VENTILATION MODE VENT: ABNORMAL
VT SETTING VENT: 300 ML
VT SETTING VENT: 380 ML
VT SETTING VENT: 400 ML
VT SETTING VENT: 406 ML
WBC # BLD AUTO: 20.9 K/UL (ref 4.3–11.1)
WBC # BLD AUTO: 21.4 K/UL (ref 4.3–11.1)
WBC # BLD AUTO: 22.5 K/UL (ref 4.3–11.1)
WBC # BLD AUTO: 23.4 K/UL (ref 4.3–11.1)
WBC # BLD AUTO: 23.9 K/UL (ref 4.3–11.1)
WBC # BLD AUTO: 25.2 K/UL (ref 4.3–11.1)
WBC URNS QL MICRO: ABNORMAL /HPF
WBC URNS QL MICRO: ABNORMAL /HPF

## 2025-01-01 PROCEDURE — 6360000002 HC RX W HCPCS: Performed by: HOSPITALIST

## 2025-01-01 PROCEDURE — 6360000002 HC RX W HCPCS: Performed by: FAMILY MEDICINE

## 2025-01-01 PROCEDURE — 6370000000 HC RX 637 (ALT 250 FOR IP): Performed by: HOSPITALIST

## 2025-01-01 PROCEDURE — 94640 AIRWAY INHALATION TREATMENT: CPT

## 2025-01-01 PROCEDURE — 6360000002 HC RX W HCPCS: Performed by: INTERNAL MEDICINE

## 2025-01-01 PROCEDURE — 2500000003 HC RX 250 WO HCPCS: Performed by: HOSPITALIST

## 2025-01-01 PROCEDURE — 80048 BASIC METABOLIC PNL TOTAL CA: CPT

## 2025-01-01 PROCEDURE — 74018 RADEX ABDOMEN 1 VIEW: CPT

## 2025-01-01 PROCEDURE — 2580000003 HC RX 258: Performed by: EMERGENCY MEDICINE

## 2025-01-01 PROCEDURE — 94003 VENT MGMT INPAT SUBQ DAY: CPT

## 2025-01-01 PROCEDURE — 2700000000 HC OXYGEN THERAPY PER DAY

## 2025-01-01 PROCEDURE — 6370000000 HC RX 637 (ALT 250 FOR IP): Performed by: INTERNAL MEDICINE

## 2025-01-01 PROCEDURE — 83605 ASSAY OF LACTIC ACID: CPT

## 2025-01-01 PROCEDURE — 87040 BLOOD CULTURE FOR BACTERIA: CPT

## 2025-01-01 PROCEDURE — 96366 THER/PROPH/DIAG IV INF ADDON: CPT

## 2025-01-01 PROCEDURE — 1100000000 HC RM PRIVATE

## 2025-01-01 PROCEDURE — 36600 WITHDRAWAL OF ARTERIAL BLOOD: CPT

## 2025-01-01 PROCEDURE — 2580000003 HC RX 258: Performed by: INTERNAL MEDICINE

## 2025-01-01 PROCEDURE — 81001 URINALYSIS AUTO W/SCOPE: CPT

## 2025-01-01 PROCEDURE — 71045 X-RAY EXAM CHEST 1 VIEW: CPT

## 2025-01-01 PROCEDURE — 99497 ADVNCD CARE PLAN 30 MIN: CPT | Performed by: INTERNAL MEDICINE

## 2025-01-01 PROCEDURE — 6370000000 HC RX 637 (ALT 250 FOR IP): Performed by: FAMILY MEDICINE

## 2025-01-01 PROCEDURE — 82962 GLUCOSE BLOOD TEST: CPT

## 2025-01-01 PROCEDURE — 99291 CRITICAL CARE FIRST HOUR: CPT | Performed by: INTERNAL MEDICINE

## 2025-01-01 PROCEDURE — 2000000000 HC ICU R&B

## 2025-01-01 PROCEDURE — 5A0935A ASSISTANCE WITH RESPIRATORY VENTILATION, LESS THAN 24 CONSECUTIVE HOURS, HIGH NASAL FLOW/VELOCITY: ICD-10-PCS | Performed by: INTERNAL MEDICINE

## 2025-01-01 PROCEDURE — 6360000002 HC RX W HCPCS: Performed by: EMERGENCY MEDICINE

## 2025-01-01 PROCEDURE — 2580000003 HC RX 258: Performed by: FAMILY MEDICINE

## 2025-01-01 PROCEDURE — 2500000003 HC RX 250 WO HCPCS: Performed by: FAMILY MEDICINE

## 2025-01-01 PROCEDURE — 2580000003 HC RX 258: Performed by: HOSPITALIST

## 2025-01-01 PROCEDURE — 85025 COMPLETE CBC W/AUTO DIFF WBC: CPT

## 2025-01-01 PROCEDURE — 2709999900 HC NON-CHARGEABLE SUPPLY: Performed by: INTERNAL MEDICINE

## 2025-01-01 PROCEDURE — 36415 COLL VENOUS BLD VENIPUNCTURE: CPT

## 2025-01-01 PROCEDURE — 94761 N-INVAS EAR/PLS OXIMETRY MLT: CPT

## 2025-01-01 PROCEDURE — 6370000000 HC RX 637 (ALT 250 FOR IP)

## 2025-01-01 PROCEDURE — 31500 INSERT EMERGENCY AIRWAY: CPT

## 2025-01-01 PROCEDURE — 5A09357 ASSISTANCE WITH RESPIRATORY VENTILATION, LESS THAN 24 CONSECUTIVE HOURS, CONTINUOUS POSITIVE AIRWAY PRESSURE: ICD-10-PCS | Performed by: INTERNAL MEDICINE

## 2025-01-01 PROCEDURE — 94002 VENT MGMT INPAT INIT DAY: CPT

## 2025-01-01 PROCEDURE — 96367 TX/PROPH/DG ADDL SEQ IV INF: CPT

## 2025-01-01 PROCEDURE — 6370000000 HC RX 637 (ALT 250 FOR IP): Performed by: EMERGENCY MEDICINE

## 2025-01-01 PROCEDURE — 0BH17EZ INSERTION OF ENDOTRACHEAL AIRWAY INTO TRACHEA, VIA NATURAL OR ARTIFICIAL OPENING: ICD-10-PCS | Performed by: INTERNAL MEDICINE

## 2025-01-01 PROCEDURE — 82803 BLOOD GASES ANY COMBINATION: CPT

## 2025-01-01 PROCEDURE — 2580000003 HC RX 258

## 2025-01-01 PROCEDURE — 99285 EMERGENCY DEPT VISIT HI MDM: CPT

## 2025-01-01 PROCEDURE — 2500000003 HC RX 250 WO HCPCS: Performed by: INTERNAL MEDICINE

## 2025-01-01 PROCEDURE — 76604 US EXAM CHEST: CPT | Performed by: INTERNAL MEDICINE

## 2025-01-01 PROCEDURE — 0202U NFCT DS 22 TRGT SARS-COV-2: CPT

## 2025-01-01 PROCEDURE — 99223 1ST HOSP IP/OBS HIGH 75: CPT | Performed by: INTERNAL MEDICINE

## 2025-01-01 PROCEDURE — BB4BZZZ ULTRASONOGRAPHY OF PLEURA: ICD-10-PCS | Performed by: INTERNAL MEDICINE

## 2025-01-01 PROCEDURE — 93005 ELECTROCARDIOGRAM TRACING: CPT | Performed by: EMERGENCY MEDICINE

## 2025-01-01 PROCEDURE — 6360000002 HC RX W HCPCS

## 2025-01-01 PROCEDURE — 99232 SBSQ HOSP IP/OBS MODERATE 35: CPT | Performed by: INTERNAL MEDICINE

## 2025-01-01 PROCEDURE — 94660 CPAP INITIATION&MGMT: CPT

## 2025-01-01 PROCEDURE — 31500 INSERT EMERGENCY AIRWAY: CPT | Performed by: INTERNAL MEDICINE

## 2025-01-01 PROCEDURE — 87635 SARS-COV-2 COVID-19 AMP PRB: CPT

## 2025-01-01 PROCEDURE — 99222 1ST HOSP IP/OBS MODERATE 55: CPT | Performed by: INTERNAL MEDICINE

## 2025-01-01 PROCEDURE — 80053 COMPREHEN METABOLIC PANEL: CPT

## 2025-01-01 PROCEDURE — C1729 CATH, DRAINAGE: HCPCS | Performed by: INTERNAL MEDICINE

## 2025-01-01 PROCEDURE — 96372 THER/PROPH/DIAG INJ SC/IM: CPT

## 2025-01-01 PROCEDURE — 71260 CT THORAX DX C+: CPT

## 2025-01-01 PROCEDURE — 6360000004 HC RX CONTRAST MEDICATION: Performed by: EMERGENCY MEDICINE

## 2025-01-01 PROCEDURE — 3E033XZ INTRODUCTION OF VASOPRESSOR INTO PERIPHERAL VEIN, PERCUTANEOUS APPROACH: ICD-10-PCS | Performed by: INTERNAL MEDICINE

## 2025-01-01 PROCEDURE — 93010 ELECTROCARDIOGRAM REPORT: CPT | Performed by: INTERNAL MEDICINE

## 2025-01-01 PROCEDURE — 87086 URINE CULTURE/COLONY COUNT: CPT

## 2025-01-01 PROCEDURE — 94664 DEMO&/EVAL PT USE INHALER: CPT

## 2025-01-01 PROCEDURE — 5A1945Z RESPIRATORY VENTILATION, 24-96 CONSECUTIVE HOURS: ICD-10-PCS | Performed by: INTERNAL MEDICINE

## 2025-01-01 PROCEDURE — 92610 EVALUATE SWALLOWING FUNCTION: CPT

## 2025-01-01 PROCEDURE — 84145 PROCALCITONIN (PCT): CPT

## 2025-01-01 PROCEDURE — 96365 THER/PROPH/DIAG IV INF INIT: CPT

## 2025-01-01 RX ORDER — GABAPENTIN 300 MG/1
600 CAPSULE ORAL 3 TIMES DAILY
COMMUNITY
Start: 2024-01-01

## 2025-01-01 RX ORDER — SODIUM CHLORIDE 9 MG/ML
INJECTION, SOLUTION INTRAVENOUS CONTINUOUS
Status: CANCELLED | OUTPATIENT
Start: 2025-01-01 | End: 2025-01-01

## 2025-01-01 RX ORDER — PROPOFOL 10 MG/ML
5-50 INJECTION, EMULSION INTRAVENOUS CONTINUOUS
Status: DISCONTINUED | OUTPATIENT
Start: 2025-01-01 | End: 2025-07-23 | Stop reason: HOSPADM

## 2025-01-01 RX ORDER — POTASSIUM CHLORIDE 7.45 MG/ML
10 INJECTION INTRAVENOUS PRN
Status: CANCELLED | OUTPATIENT
Start: 2025-01-01

## 2025-01-01 RX ORDER — ENOXAPARIN SODIUM 100 MG/ML
40 INJECTION SUBCUTANEOUS EVERY 24 HOURS
Status: DISCONTINUED | OUTPATIENT
Start: 2025-01-01 | End: 2025-01-01 | Stop reason: HOSPADM

## 2025-01-01 RX ORDER — BUDESONIDE 0.5 MG/2ML
0.5 INHALANT ORAL
Status: DISCONTINUED | OUTPATIENT
Start: 2025-01-01 | End: 2025-01-01

## 2025-01-01 RX ORDER — POLYETHYLENE GLYCOL 3350 17 G/17G
17 POWDER, FOR SOLUTION ORAL DAILY PRN
Status: DISCONTINUED | OUTPATIENT
Start: 2025-01-01 | End: 2025-01-01 | Stop reason: HOSPADM

## 2025-01-01 RX ORDER — ACETAMINOPHEN 325 MG/1
650 TABLET ORAL EVERY 6 HOURS PRN
Status: DISCONTINUED | OUTPATIENT
Start: 2025-01-01 | End: 2025-01-01

## 2025-01-01 RX ORDER — ACETAMINOPHEN 650 MG/1
650 SUPPOSITORY RECTAL EVERY 6 HOURS PRN
Status: DISCONTINUED | OUTPATIENT
Start: 2025-01-01 | End: 2025-01-01

## 2025-01-01 RX ORDER — MAGNESIUM SULFATE IN WATER 40 MG/ML
2000 INJECTION, SOLUTION INTRAVENOUS PRN
Status: DISCONTINUED | OUTPATIENT
Start: 2025-01-01 | End: 2025-07-23 | Stop reason: HOSPADM

## 2025-01-01 RX ORDER — SODIUM CHLORIDE 0.9 % (FLUSH) 0.9 %
5-40 SYRINGE (ML) INJECTION PRN
Status: CANCELLED | OUTPATIENT
Start: 2025-01-01

## 2025-01-01 RX ORDER — ACETAMINOPHEN 650 MG/1
650 SUPPOSITORY RECTAL EVERY 6 HOURS PRN
Status: DISCONTINUED | OUTPATIENT
Start: 2025-01-01 | End: 2025-01-01 | Stop reason: HOSPADM

## 2025-01-01 RX ORDER — SODIUM CHLORIDE 9 MG/ML
INJECTION, SOLUTION INTRAVENOUS PRN
Status: CANCELLED | OUTPATIENT
Start: 2025-01-01

## 2025-01-01 RX ORDER — POTASSIUM CHLORIDE 29.8 MG/ML
20 INJECTION INTRAVENOUS PRN
Status: DISCONTINUED | OUTPATIENT
Start: 2025-01-01 | End: 2025-07-23 | Stop reason: HOSPADM

## 2025-01-01 RX ORDER — ENOXAPARIN SODIUM 100 MG/ML
40 INJECTION SUBCUTANEOUS EVERY 24 HOURS
Status: DISCONTINUED | OUTPATIENT
Start: 2025-01-01 | End: 2025-01-01

## 2025-01-01 RX ORDER — TRAMADOL HYDROCHLORIDE 50 MG/1
50 TABLET ORAL EVERY 6 HOURS PRN
Status: DISCONTINUED | OUTPATIENT
Start: 2025-01-01 | End: 2025-07-23 | Stop reason: HOSPADM

## 2025-01-01 RX ORDER — POTASSIUM CHLORIDE 7.45 MG/ML
10 INJECTION INTRAVENOUS PRN
Status: DISCONTINUED | OUTPATIENT
Start: 2025-01-01 | End: 2025-07-23 | Stop reason: HOSPADM

## 2025-01-01 RX ORDER — FENTANYL CITRATE-0.9 % NACL/PF 10 MCG/ML
25-200 PLASTIC BAG, INJECTION (ML) INTRAVENOUS CONTINUOUS
Refills: 0 | Status: DISCONTINUED | OUTPATIENT
Start: 2025-01-01 | End: 2025-01-01

## 2025-01-01 RX ORDER — ONDANSETRON 2 MG/ML
4 INJECTION INTRAMUSCULAR; INTRAVENOUS EVERY 6 HOURS PRN
Status: CANCELLED | OUTPATIENT
Start: 2025-01-01

## 2025-01-01 RX ORDER — ALBUTEROL SULFATE 0.83 MG/ML
2.5 SOLUTION RESPIRATORY (INHALATION) EVERY 4 HOURS PRN
Status: DISCONTINUED | OUTPATIENT
Start: 2025-01-01 | End: 2025-01-01 | Stop reason: HOSPADM

## 2025-01-01 RX ORDER — POTASSIUM CHLORIDE 29.8 MG/ML
20 INJECTION INTRAVENOUS PRN
Status: DISCONTINUED | OUTPATIENT
Start: 2025-01-01 | End: 2025-01-01 | Stop reason: HOSPADM

## 2025-01-01 RX ORDER — IOPAMIDOL 755 MG/ML
100 INJECTION, SOLUTION INTRAVASCULAR
Status: CANCELLED | OUTPATIENT
Start: 2025-01-01

## 2025-01-01 RX ORDER — SODIUM CHLORIDE 9 MG/ML
8 INJECTION, SOLUTION INTRAVENOUS CONTINUOUS
Status: DISCONTINUED | OUTPATIENT
Start: 2025-01-01 | End: 2025-07-23 | Stop reason: HOSPADM

## 2025-01-01 RX ORDER — MORPHINE SULFATE 4 MG/ML
5 INJECTION INTRAVENOUS EVERY 4 HOURS PRN
Status: DISCONTINUED | OUTPATIENT
Start: 2025-01-01 | End: 2025-01-01

## 2025-01-01 RX ORDER — FENTANYL CITRATE-0.9 % NACL/PF 20 MCG/2ML
50 SYRINGE (ML) INTRAVENOUS EVERY 30 MIN PRN
Refills: 0 | Status: DISCONTINUED | OUTPATIENT
Start: 2025-01-01 | End: 2025-01-01

## 2025-01-01 RX ORDER — ETOMIDATE 2 MG/ML
0.3 INJECTION INTRAVENOUS ONCE
Status: COMPLETED | OUTPATIENT
Start: 2025-01-01 | End: 2025-01-01

## 2025-01-01 RX ORDER — ENOXAPARIN SODIUM 100 MG/ML
40 INJECTION SUBCUTANEOUS DAILY
Status: CANCELLED | OUTPATIENT
Start: 2025-01-01

## 2025-01-01 RX ORDER — GLYCOPYRROLATE 0.2 MG/ML
0.1 INJECTION INTRAMUSCULAR; INTRAVENOUS 2 TIMES DAILY PRN
Status: DISCONTINUED | OUTPATIENT
Start: 2025-01-01 | End: 2025-07-23 | Stop reason: HOSPADM

## 2025-01-01 RX ORDER — SODIUM CHLORIDE 0.9 % (FLUSH) 0.9 %
5-40 SYRINGE (ML) INJECTION PRN
Status: DISCONTINUED | OUTPATIENT
Start: 2025-01-01 | End: 2025-01-01 | Stop reason: HOSPADM

## 2025-01-01 RX ORDER — ACETAMINOPHEN 325 MG/1
650 TABLET ORAL EVERY 6 HOURS PRN
Status: CANCELLED | OUTPATIENT
Start: 2025-01-01

## 2025-01-01 RX ORDER — LINEZOLID 2 MG/ML
600 INJECTION, SOLUTION INTRAVENOUS EVERY 12 HOURS
Status: DISCONTINUED | OUTPATIENT
Start: 2025-01-01 | End: 2025-01-01

## 2025-01-01 RX ORDER — GABAPENTIN 100 MG/1
200 CAPSULE ORAL 3 TIMES DAILY
Status: DISCONTINUED | OUTPATIENT
Start: 2025-01-01 | End: 2025-07-23 | Stop reason: HOSPADM

## 2025-01-01 RX ORDER — IOPAMIDOL 755 MG/ML
100 INJECTION, SOLUTION INTRAVASCULAR
Status: COMPLETED | OUTPATIENT
Start: 2025-01-01 | End: 2025-01-01

## 2025-01-01 RX ORDER — SODIUM CHLORIDE 0.9 % (FLUSH) 0.9 %
5-40 SYRINGE (ML) INJECTION EVERY 12 HOURS SCHEDULED
Status: DISCONTINUED | OUTPATIENT
Start: 2025-01-01 | End: 2025-01-01 | Stop reason: HOSPADM

## 2025-01-01 RX ORDER — SODIUM CHLORIDE 0.9 % (FLUSH) 0.9 %
5-40 SYRINGE (ML) INJECTION EVERY 12 HOURS SCHEDULED
Status: CANCELLED | OUTPATIENT
Start: 2025-01-01

## 2025-01-01 RX ORDER — POTASSIUM CHLORIDE 750 MG/1
20 TABLET, EXTENDED RELEASE ORAL DAILY
COMMUNITY
Start: 2025-01-01 | End: 2026-01-16

## 2025-01-01 RX ORDER — IPRATROPIUM BROMIDE AND ALBUTEROL SULFATE 2.5; .5 MG/3ML; MG/3ML
1 SOLUTION RESPIRATORY (INHALATION)
Status: CANCELLED | OUTPATIENT
Start: 2025-01-01

## 2025-01-01 RX ORDER — 0.9 % SODIUM CHLORIDE 0.9 %
1000 INTRAVENOUS SOLUTION INTRAVENOUS ONCE
Status: COMPLETED | OUTPATIENT
Start: 2025-01-01 | End: 2025-01-01

## 2025-01-01 RX ORDER — SODIUM CHLORIDE, SODIUM LACTATE, POTASSIUM CHLORIDE, AND CALCIUM CHLORIDE .6; .31; .03; .02 G/100ML; G/100ML; G/100ML; G/100ML
250 INJECTION, SOLUTION INTRAVENOUS ONCE
Status: COMPLETED | OUTPATIENT
Start: 2025-01-01 | End: 2025-01-01

## 2025-01-01 RX ORDER — IPRATROPIUM BROMIDE AND ALBUTEROL SULFATE 2.5; .5 MG/3ML; MG/3ML
1 SOLUTION RESPIRATORY (INHALATION) EVERY 6 HOURS PRN
Status: DISCONTINUED | OUTPATIENT
Start: 2025-01-01 | End: 2025-07-23 | Stop reason: HOSPADM

## 2025-01-01 RX ORDER — SODIUM CHLORIDE FOR INHALATION 3 %
4 VIAL, NEBULIZER (ML) INHALATION 2 TIMES DAILY
Status: DISCONTINUED | OUTPATIENT
Start: 2025-01-01 | End: 2025-01-01

## 2025-01-01 RX ORDER — MAGNESIUM SULFATE IN WATER 40 MG/ML
2000 INJECTION, SOLUTION INTRAVENOUS PRN
Status: DISCONTINUED | OUTPATIENT
Start: 2025-01-01 | End: 2025-01-01 | Stop reason: HOSPADM

## 2025-01-01 RX ORDER — MORPHINE SULFATE 2 MG/ML
2 INJECTION, SOLUTION INTRAMUSCULAR; INTRAVENOUS ONCE
Refills: 0 | Status: COMPLETED | OUTPATIENT
Start: 2025-01-01 | End: 2025-01-01

## 2025-01-01 RX ORDER — MIDAZOLAM HYDROCHLORIDE 2 MG/2ML
2 INJECTION, SOLUTION INTRAMUSCULAR; INTRAVENOUS
Status: DISCONTINUED | OUTPATIENT
Start: 2025-01-01 | End: 2025-07-23 | Stop reason: HOSPADM

## 2025-01-01 RX ORDER — ONDANSETRON 2 MG/ML
4 INJECTION INTRAMUSCULAR; INTRAVENOUS EVERY 4 HOURS PRN
Status: DISCONTINUED | OUTPATIENT
Start: 2025-01-01 | End: 2025-07-23 | Stop reason: HOSPADM

## 2025-01-01 RX ORDER — ALBUTEROL SULFATE 0.83 MG/ML
2.5 SOLUTION RESPIRATORY (INHALATION) EVERY 4 HOURS PRN
Status: CANCELLED | OUTPATIENT
Start: 2025-01-01

## 2025-01-01 RX ORDER — LEVOFLOXACIN 750 MG/1
750 TABLET, FILM COATED ORAL DAILY
COMMUNITY
Start: 2025-01-01

## 2025-01-01 RX ORDER — SODIUM CHLORIDE 9 MG/ML
INJECTION, SOLUTION INTRAVENOUS PRN
Status: DISCONTINUED | OUTPATIENT
Start: 2025-01-01 | End: 2025-01-01 | Stop reason: HOSPADM

## 2025-01-01 RX ORDER — ALBUTEROL SULFATE 0.83 MG/ML
2.5 SOLUTION RESPIRATORY (INHALATION) EVERY 4 HOURS PRN
Status: DISCONTINUED | OUTPATIENT
Start: 2025-01-01 | End: 2025-07-23 | Stop reason: HOSPADM

## 2025-01-01 RX ORDER — ONDANSETRON 4 MG/1
4 TABLET, ORALLY DISINTEGRATING ORAL EVERY 6 HOURS PRN
Status: DISCONTINUED | OUTPATIENT
Start: 2025-01-01 | End: 2025-01-01

## 2025-01-01 RX ORDER — AMLODIPINE BESYLATE 5 MG/1
5 TABLET ORAL DAILY
Status: DISCONTINUED | OUTPATIENT
Start: 2025-01-01 | End: 2025-01-01

## 2025-01-01 RX ORDER — GUAIFENESIN 600 MG/1
1200 TABLET, EXTENDED RELEASE ORAL 2 TIMES DAILY
Status: DISCONTINUED | OUTPATIENT
Start: 2025-01-01 | End: 2025-07-23 | Stop reason: HOSPADM

## 2025-01-01 RX ORDER — DEXAMETHASONE 4 MG/1
4 TABLET ORAL 2 TIMES DAILY
COMMUNITY
Start: 2025-01-01

## 2025-01-01 RX ORDER — ACETAMINOPHEN 650 MG/1
650 SUPPOSITORY RECTAL EVERY 6 HOURS PRN
Status: CANCELLED | OUTPATIENT
Start: 2025-01-01

## 2025-01-01 RX ORDER — IPRATROPIUM BROMIDE AND ALBUTEROL SULFATE 2.5; .5 MG/3ML; MG/3ML
1 SOLUTION RESPIRATORY (INHALATION)
Status: DISCONTINUED | OUTPATIENT
Start: 2025-01-01 | End: 2025-01-01

## 2025-01-01 RX ORDER — ONDANSETRON 4 MG/1
4 TABLET, ORALLY DISINTEGRATING ORAL EVERY 6 HOURS PRN
Status: DISCONTINUED | OUTPATIENT
Start: 2025-01-01 | End: 2025-01-01 | Stop reason: HOSPADM

## 2025-01-01 RX ORDER — ALBUTEROL SULFATE 90 UG/1
2 INHALANT RESPIRATORY (INHALATION) EVERY 6 HOURS PRN
COMMUNITY
Start: 2025-01-01

## 2025-01-01 RX ORDER — SODIUM CHLORIDE 9 MG/ML
INJECTION, SOLUTION INTRAVENOUS CONTINUOUS
Status: DISCONTINUED | OUTPATIENT
Start: 2025-01-01 | End: 2025-01-01 | Stop reason: HOSPADM

## 2025-01-01 RX ORDER — MENTHOL/CAMPHOR/ALLANTOIN/PHE 0.6-0.5-1%
OINTMENT(EA) TOPICAL PRN
Status: DISCONTINUED | OUTPATIENT
Start: 2025-01-01 | End: 2025-07-23 | Stop reason: HOSPADM

## 2025-01-01 RX ORDER — POLYETHYLENE GLYCOL 3350 17 G/17G
17 POWDER, FOR SOLUTION ORAL DAILY PRN
Status: DISCONTINUED | OUTPATIENT
Start: 2025-01-01 | End: 2025-07-23 | Stop reason: HOSPADM

## 2025-01-01 RX ORDER — ONDANSETRON 2 MG/ML
4 INJECTION INTRAMUSCULAR; INTRAVENOUS EVERY 6 HOURS PRN
Status: DISCONTINUED | OUTPATIENT
Start: 2025-01-01 | End: 2025-01-01

## 2025-01-01 RX ORDER — LORAZEPAM 1 MG/1
1 TABLET ORAL 2 TIMES DAILY PRN
COMMUNITY
Start: 2025-01-01

## 2025-01-01 RX ORDER — FUROSEMIDE 10 MG/ML
20 INJECTION INTRAMUSCULAR; INTRAVENOUS ONCE
Status: COMPLETED | OUTPATIENT
Start: 2025-01-01 | End: 2025-01-01

## 2025-01-01 RX ORDER — SODIUM CHLORIDE 0.9 % (FLUSH) 0.9 %
5-40 SYRINGE (ML) INJECTION EVERY 12 HOURS SCHEDULED
Status: DISCONTINUED | OUTPATIENT
Start: 2025-01-01 | End: 2025-01-01

## 2025-01-01 RX ORDER — LORAZEPAM 2 MG/ML
2 CONCENTRATE ORAL EVERY 4 HOURS PRN
Status: DISCONTINUED | OUTPATIENT
Start: 2025-01-01 | End: 2025-01-01

## 2025-01-01 RX ORDER — FENTANYL CITRATE-0.9 % NACL/PF 10 MCG/ML
25-200 PLASTIC BAG, INJECTION (ML) INTRAVENOUS CONTINUOUS
Status: DISCONTINUED | OUTPATIENT
Start: 2025-01-01 | End: 2025-07-23 | Stop reason: HOSPADM

## 2025-01-01 RX ORDER — HYDROCODONE BITARTRATE AND HOMATROPINE METHYLBROMIDE ORAL SOLUTION 5; 1.5 MG/5ML; MG/5ML
5 LIQUID ORAL EVERY 4 HOURS PRN
Refills: 0 | Status: DISCONTINUED | OUTPATIENT
Start: 2025-01-01 | End: 2025-01-01

## 2025-01-01 RX ORDER — MORPHINE SULFATE 2 MG/ML
2 INJECTION, SOLUTION INTRAMUSCULAR; INTRAVENOUS
Status: DISCONTINUED | OUTPATIENT
Start: 2025-01-01 | End: 2025-07-23 | Stop reason: HOSPADM

## 2025-01-01 RX ORDER — FENTANYL CITRATE 50 UG/ML
50 INJECTION, SOLUTION INTRAMUSCULAR; INTRAVENOUS ONCE
Refills: 0 | Status: COMPLETED | OUTPATIENT
Start: 2025-01-01 | End: 2025-01-01

## 2025-01-01 RX ORDER — ONDANSETRON 4 MG/1
4 TABLET, FILM COATED ORAL 3 TIMES DAILY PRN
COMMUNITY
Start: 2025-01-01

## 2025-01-01 RX ORDER — ROCURONIUM BROMIDE 10 MG/ML
0.6 INJECTION, SOLUTION INTRAVENOUS ONCE
Status: COMPLETED | OUTPATIENT
Start: 2025-01-01 | End: 2025-01-01

## 2025-01-01 RX ORDER — SODIUM CHLORIDE 0.9 % (FLUSH) 0.9 %
5-40 SYRINGE (ML) INJECTION PRN
Status: DISCONTINUED | OUTPATIENT
Start: 2025-01-01 | End: 2025-07-23 | Stop reason: HOSPADM

## 2025-01-01 RX ORDER — MORPHINE SULFATE 4 MG/ML
4 INJECTION, SOLUTION INTRAMUSCULAR; INTRAVENOUS
Status: DISCONTINUED | OUTPATIENT
Start: 2025-01-01 | End: 2025-07-23 | Stop reason: HOSPADM

## 2025-01-01 RX ORDER — POTASSIUM CHLORIDE 7.45 MG/ML
10 INJECTION INTRAVENOUS PRN
Status: DISCONTINUED | OUTPATIENT
Start: 2025-01-01 | End: 2025-01-01 | Stop reason: HOSPADM

## 2025-01-01 RX ORDER — BROMPHENIRAMINE MALEATE, PSEUDOEPHEDRINE HYDROCHLORIDE, AND DEXTROMETHORPHAN HYDROBROMIDE 2; 30; 10 MG/5ML; MG/5ML; MG/5ML
7.5 SYRUP ORAL EVERY 6 HOURS PRN
COMMUNITY
Start: 2025-01-01

## 2025-01-01 RX ORDER — HYOSCYAMINE SULFATE 0.12 MG/1
0.12 TABLET SUBLINGUAL 3 TIMES DAILY PRN
COMMUNITY
Start: 2025-01-01

## 2025-01-01 RX ORDER — IPRATROPIUM BROMIDE AND ALBUTEROL SULFATE 2.5; .5 MG/3ML; MG/3ML
1 SOLUTION RESPIRATORY (INHALATION)
Status: COMPLETED | OUTPATIENT
Start: 2025-01-01 | End: 2025-01-01

## 2025-01-01 RX ORDER — DEXMEDETOMIDINE HYDROCHLORIDE 4 UG/ML
.1-1.5 INJECTION, SOLUTION INTRAVENOUS CONTINUOUS
Status: DISCONTINUED | OUTPATIENT
Start: 2025-01-01 | End: 2025-07-23 | Stop reason: HOSPADM

## 2025-01-01 RX ORDER — SODIUM CHLORIDE 9 MG/ML
INJECTION, SOLUTION INTRAVENOUS CONTINUOUS
Status: ACTIVE | OUTPATIENT
Start: 2025-01-01 | End: 2025-01-01

## 2025-01-01 RX ORDER — HYDROCODONE BITARTRATE AND ACETAMINOPHEN 5; 325 MG/1; MG/1
1 TABLET ORAL EVERY 4 HOURS PRN
COMMUNITY
Start: 2025-01-01 | End: 2025-01-01

## 2025-01-01 RX ORDER — ACETAMINOPHEN 325 MG/1
650 TABLET ORAL ONCE
Status: COMPLETED | OUTPATIENT
Start: 2025-01-01 | End: 2025-01-01

## 2025-01-01 RX ORDER — GABAPENTIN 100 MG/1
200 CAPSULE ORAL 3 TIMES DAILY
Status: DISCONTINUED | OUTPATIENT
Start: 2025-01-01 | End: 2025-01-01

## 2025-01-01 RX ORDER — POLYETHYLENE GLYCOL 3350 17 G/17G
17 POWDER, FOR SOLUTION ORAL DAILY PRN
Status: CANCELLED | OUTPATIENT
Start: 2025-01-01

## 2025-01-01 RX ORDER — PROMETHAZINE HYDROCHLORIDE 25 MG/1
25 TABLET ORAL EVERY 6 HOURS PRN
Status: DISCONTINUED | OUTPATIENT
Start: 2025-01-01 | End: 2025-07-23 | Stop reason: HOSPADM

## 2025-01-01 RX ORDER — ARFORMOTEROL TARTRATE 15 UG/2ML
15 SOLUTION RESPIRATORY (INHALATION)
Status: DISCONTINUED | OUTPATIENT
Start: 2025-01-01 | End: 2025-01-01

## 2025-01-01 RX ORDER — LIDOCAINE HYDROCHLORIDE 20 MG/ML
5 SOLUTION OROPHARYNGEAL EVERY 4 HOURS PRN
COMMUNITY
Start: 2025-01-01

## 2025-01-01 RX ORDER — ACETAMINOPHEN 650 MG/1
650 SUPPOSITORY RECTAL EVERY 6 HOURS PRN
Status: DISCONTINUED | OUTPATIENT
Start: 2025-01-01 | End: 2025-07-23 | Stop reason: HOSPADM

## 2025-01-01 RX ORDER — SODIUM CHLORIDE 9 MG/ML
INJECTION, SOLUTION INTRAVENOUS PRN
Status: DISCONTINUED | OUTPATIENT
Start: 2025-01-01 | End: 2025-07-23 | Stop reason: HOSPADM

## 2025-01-01 RX ORDER — FENTANYL CITRATE-0.9 % NACL/PF 20 MCG/2ML
50 SYRINGE (ML) INTRAVENOUS EVERY 30 MIN PRN
Refills: 0 | Status: DISCONTINUED | OUTPATIENT
Start: 2025-01-01 | End: 2025-07-23 | Stop reason: HOSPADM

## 2025-01-01 RX ORDER — POTASSIUM CHLORIDE 29.8 MG/ML
20 INJECTION INTRAVENOUS PRN
Status: CANCELLED | OUTPATIENT
Start: 2025-01-01

## 2025-01-01 RX ORDER — IPRATROPIUM BROMIDE AND ALBUTEROL SULFATE 2.5; .5 MG/3ML; MG/3ML
1 SOLUTION RESPIRATORY (INHALATION)
Status: DISCONTINUED | OUTPATIENT
Start: 2025-01-01 | End: 2025-01-01 | Stop reason: HOSPADM

## 2025-01-01 RX ORDER — ACETAMINOPHEN 325 MG/1
650 TABLET ORAL EVERY 6 HOURS PRN
Status: DISCONTINUED | OUTPATIENT
Start: 2025-01-01 | End: 2025-01-01 | Stop reason: HOSPADM

## 2025-01-01 RX ORDER — HYOSCYAMINE SULFATE 0.12 MG/1
0.12 TABLET SUBLINGUAL EVERY 8 HOURS PRN
Status: DISCONTINUED | OUTPATIENT
Start: 2025-01-01 | End: 2025-07-23 | Stop reason: HOSPADM

## 2025-01-01 RX ORDER — FAMOTIDINE 20 MG/1
20 TABLET, FILM COATED ORAL DAILY
COMMUNITY
Start: 2025-01-01

## 2025-01-01 RX ORDER — ACETAMINOPHEN 325 MG/1
650 TABLET ORAL EVERY 6 HOURS PRN
Status: DISCONTINUED | OUTPATIENT
Start: 2025-01-01 | End: 2025-07-23 | Stop reason: HOSPADM

## 2025-01-01 RX ORDER — ONDANSETRON 4 MG/1
4 TABLET, ORALLY DISINTEGRATING ORAL EVERY 6 HOURS PRN
Status: CANCELLED | OUTPATIENT
Start: 2025-01-01

## 2025-01-01 RX ORDER — MAGNESIUM SULFATE IN WATER 40 MG/ML
2000 INJECTION, SOLUTION INTRAVENOUS PRN
Status: CANCELLED | OUTPATIENT
Start: 2025-01-01

## 2025-01-01 RX ORDER — ONDANSETRON 2 MG/ML
4 INJECTION INTRAMUSCULAR; INTRAVENOUS EVERY 6 HOURS PRN
Status: DISCONTINUED | OUTPATIENT
Start: 2025-01-01 | End: 2025-01-01 | Stop reason: HOSPADM

## 2025-01-01 RX ADMIN — GABAPENTIN 200 MG: 100 CAPSULE ORAL at 14:43

## 2025-01-01 RX ADMIN — DEXMEDETOMIDINE 1 MCG/KG/HR: 100 INJECTION, SOLUTION INTRAVENOUS at 02:44

## 2025-01-01 RX ADMIN — GUAIFENESIN 1200 MG: 600 TABLET ORAL at 19:52

## 2025-01-01 RX ADMIN — Medication 50 MCG: at 01:36

## 2025-01-01 RX ADMIN — Medication 4 ML: at 20:40

## 2025-01-01 RX ADMIN — FENTANYL CITRATE 100 MCG/HR: 0.05 INJECTION, SOLUTION INTRAMUSCULAR; INTRAVENOUS at 10:41

## 2025-01-01 RX ADMIN — IPRATROPIUM BROMIDE AND ALBUTEROL SULFATE 1 DOSE: 2.5; .5 SOLUTION RESPIRATORY (INHALATION) at 15:33

## 2025-01-01 RX ADMIN — LINEZOLID 600 MG: 600 INJECTION, SOLUTION INTRAVENOUS at 16:37

## 2025-01-01 RX ADMIN — FENTANYL CITRATE 50 MCG/HR: 0.05 INJECTION, SOLUTION INTRAMUSCULAR; INTRAVENOUS at 11:53

## 2025-01-01 RX ADMIN — PIPERACILLIN AND TAZOBACTAM 3375 MG: 3; .375 INJECTION, POWDER, FOR SOLUTION INTRAVENOUS at 06:10

## 2025-01-01 RX ADMIN — Medication 4 ML: at 07:41

## 2025-01-01 RX ADMIN — DEXMEDETOMIDINE 1.2 MCG/KG/HR: 100 INJECTION, SOLUTION INTRAVENOUS at 12:09

## 2025-01-01 RX ADMIN — MIDAZOLAM HYDROCHLORIDE 2 MG: 1 INJECTION, SOLUTION INTRAMUSCULAR; INTRAVENOUS at 16:25

## 2025-01-01 RX ADMIN — PIPERACILLIN AND TAZOBACTAM 3375 MG: 3; .375 INJECTION, POWDER, FOR SOLUTION INTRAVENOUS at 14:27

## 2025-01-01 RX ADMIN — PIPERACILLIN AND TAZOBACTAM 3375 MG: 3; .375 INJECTION, POWDER, FOR SOLUTION INTRAVENOUS at 22:46

## 2025-01-01 RX ADMIN — Medication 4 ML: at 07:25

## 2025-01-01 RX ADMIN — IPRATROPIUM BROMIDE AND ALBUTEROL SULFATE 1 DOSE: 2.5; .5 SOLUTION RESPIRATORY (INHALATION) at 15:50

## 2025-01-01 RX ADMIN — ARFORMOTEROL TARTRATE 15 MCG: 15 SOLUTION RESPIRATORY (INHALATION) at 19:25

## 2025-01-01 RX ADMIN — PIPERACILLIN AND TAZOBACTAM 3375 MG: 3; .375 INJECTION, POWDER, FOR SOLUTION INTRAVENOUS at 14:14

## 2025-01-01 RX ADMIN — IPRATROPIUM BROMIDE AND ALBUTEROL SULFATE 1 DOSE: 2.5; .5 SOLUTION RESPIRATORY (INHALATION) at 10:39

## 2025-01-01 RX ADMIN — PIPERACILLIN AND TAZOBACTAM 3375 MG: 3; .375 INJECTION, POWDER, FOR SOLUTION INTRAVENOUS at 14:31

## 2025-01-01 RX ADMIN — IPRATROPIUM BROMIDE AND ALBUTEROL SULFATE 1 DOSE: 2.5; .5 SOLUTION RESPIRATORY (INHALATION) at 12:38

## 2025-01-01 RX ADMIN — Medication 50 MCG: at 11:22

## 2025-01-01 RX ADMIN — MIDAZOLAM HYDROCHLORIDE 2 MG: 1 INJECTION, SOLUTION INTRAMUSCULAR; INTRAVENOUS at 13:18

## 2025-01-01 RX ADMIN — FAMOTIDINE 20 MG: 10 INJECTION, SOLUTION INTRAVENOUS at 08:56

## 2025-01-01 RX ADMIN — ENOXAPARIN SODIUM 40 MG: 100 INJECTION SUBCUTANEOUS at 11:52

## 2025-01-01 RX ADMIN — LINEZOLID 600 MG: 600 INJECTION, SOLUTION INTRAVENOUS at 04:03

## 2025-01-01 RX ADMIN — TRAMADOL HYDROCHLORIDE 50 MG: 50 TABLET, COATED ORAL at 18:11

## 2025-01-01 RX ADMIN — Medication 50 MCG: at 13:20

## 2025-01-01 RX ADMIN — IPRATROPIUM BROMIDE AND ALBUTEROL SULFATE 1 DOSE: 2.5; .5 SOLUTION RESPIRATORY (INHALATION) at 07:02

## 2025-01-01 RX ADMIN — DEXMEDETOMIDINE 1 MCG/KG/HR: 100 INJECTION, SOLUTION INTRAVENOUS at 14:56

## 2025-01-01 RX ADMIN — IPRATROPIUM BROMIDE AND ALBUTEROL SULFATE 1 DOSE: 2.5; .5 SOLUTION RESPIRATORY (INHALATION) at 11:18

## 2025-01-01 RX ADMIN — ENOXAPARIN SODIUM 40 MG: 100 INJECTION SUBCUTANEOUS at 11:09

## 2025-01-01 RX ADMIN — GABAPENTIN 200 MG: 100 CAPSULE ORAL at 08:44

## 2025-01-01 RX ADMIN — SODIUM CHLORIDE: 0.9 INJECTION, SOLUTION INTRAVENOUS at 14:06

## 2025-01-01 RX ADMIN — ARFORMOTEROL TARTRATE 15 MCG: 15 SOLUTION RESPIRATORY (INHALATION) at 07:24

## 2025-01-01 RX ADMIN — SODIUM CHLORIDE, PRESERVATIVE FREE 10 ML: 5 INJECTION INTRAVENOUS at 08:45

## 2025-01-01 RX ADMIN — LORAZEPAM 2 MG: 2 SOLUTION, CONCENTRATE ORAL at 03:31

## 2025-01-01 RX ADMIN — FAMOTIDINE 20 MG: 10 INJECTION, SOLUTION INTRAVENOUS at 19:26

## 2025-01-01 RX ADMIN — MORPHINE SULFATE 5 MG: 4 INJECTION INTRAVENOUS at 08:24

## 2025-01-01 RX ADMIN — BUDESONIDE INHALATION 500 MCG: 0.5 SUSPENSION RESPIRATORY (INHALATION) at 19:23

## 2025-01-01 RX ADMIN — ARFORMOTEROL TARTRATE 15 MCG: 15 SOLUTION RESPIRATORY (INHALATION) at 07:41

## 2025-01-01 RX ADMIN — MORPHINE SULFATE 5 MG: 4 INJECTION INTRAVENOUS at 11:23

## 2025-01-01 RX ADMIN — MORPHINE SULFATE 2 MG: 2 INJECTION, SOLUTION INTRAMUSCULAR; INTRAVENOUS at 10:39

## 2025-01-01 RX ADMIN — SODIUM CHLORIDE, SODIUM LACTATE, POTASSIUM CHLORIDE, AND CALCIUM CHLORIDE 250 ML: .6; .31; .03; .02 INJECTION, SOLUTION INTRAVENOUS at 19:28

## 2025-01-01 RX ADMIN — DEXMEDETOMIDINE 1.2 MCG/KG/HR: 100 INJECTION, SOLUTION INTRAVENOUS at 07:32

## 2025-01-01 RX ADMIN — IOPAMIDOL 100 ML: 755 INJECTION, SOLUTION INTRAVENOUS at 08:00

## 2025-01-01 RX ADMIN — Medication 4 ML: at 19:25

## 2025-01-01 RX ADMIN — FENTANYL CITRATE 75 MCG/HR: 0.05 INJECTION, SOLUTION INTRAMUSCULAR; INTRAVENOUS at 00:02

## 2025-01-01 RX ADMIN — ROCURONIUM BROMIDE 49 MG: 50 INJECTION INTRAVENOUS at 11:51

## 2025-01-01 RX ADMIN — BUDESONIDE INHALATION 500 MCG: 0.5 SUSPENSION RESPIRATORY (INHALATION) at 19:19

## 2025-01-01 RX ADMIN — Medication 7 G: at 20:26

## 2025-01-01 RX ADMIN — ACETAMINOPHEN 650 MG: 325 TABLET ORAL at 13:07

## 2025-01-01 RX ADMIN — GABAPENTIN 200 MG: 100 CAPSULE ORAL at 08:30

## 2025-01-01 RX ADMIN — PROPOFOL 35 MCG/KG/MIN: 10 INJECTION, EMULSION INTRAVENOUS at 17:05

## 2025-01-01 RX ADMIN — PIPERACILLIN AND TAZOBACTAM 3375 MG: 3; .375 INJECTION, POWDER, FOR SOLUTION INTRAVENOUS at 05:40

## 2025-01-01 RX ADMIN — ACETAMINOPHEN 650 MG: 325 TABLET, FILM COATED ORAL at 12:33

## 2025-01-01 RX ADMIN — SODIUM CHLORIDE, PRESERVATIVE FREE 10 ML: 5 INJECTION INTRAVENOUS at 19:53

## 2025-01-01 RX ADMIN — LINEZOLID 600 MG: 600 INJECTION, SOLUTION INTRAVENOUS at 04:27

## 2025-01-01 RX ADMIN — EPOPROSTENOL 50 NG/KG/MIN: 1.5 INJECTION, POWDER, LYOPHILIZED, FOR SOLUTION INTRAVENOUS at 05:01

## 2025-01-01 RX ADMIN — SODIUM CHLORIDE 1000 ML: 0.9 INJECTION, SOLUTION INTRAVENOUS at 08:39

## 2025-01-01 RX ADMIN — IPRATROPIUM BROMIDE AND ALBUTEROL SULFATE 1 DOSE: 2.5; .5 SOLUTION RESPIRATORY (INHALATION) at 15:49

## 2025-01-01 RX ADMIN — FAMOTIDINE 20 MG: 10 INJECTION, SOLUTION INTRAVENOUS at 19:52

## 2025-01-01 RX ADMIN — LINEZOLID 600 MG: 600 INJECTION, SOLUTION INTRAVENOUS at 15:44

## 2025-01-01 RX ADMIN — LINEZOLID 600 MG: 600 INJECTION, SOLUTION INTRAVENOUS at 16:17

## 2025-01-01 RX ADMIN — Medication 4 ML: at 07:43

## 2025-01-01 RX ADMIN — ACETAMINOPHEN 650 MG: 325 TABLET ORAL at 16:37

## 2025-01-01 RX ADMIN — BUDESONIDE INHALATION 500 MCG: 0.5 SUSPENSION RESPIRATORY (INHALATION) at 07:43

## 2025-01-01 RX ADMIN — GABAPENTIN 200 MG: 100 CAPSULE ORAL at 08:52

## 2025-01-01 RX ADMIN — DEXMEDETOMIDINE 1 MCG/KG/HR: 100 INJECTION, SOLUTION INTRAVENOUS at 21:05

## 2025-01-01 RX ADMIN — SODIUM CHLORIDE: 0.9 INJECTION, SOLUTION INTRAVENOUS at 22:13

## 2025-01-01 RX ADMIN — DEXMEDETOMIDINE 0.2 MCG/KG/HR: 100 INJECTION, SOLUTION INTRAVENOUS at 08:37

## 2025-01-01 RX ADMIN — PROPOFOL 40 MCG/KG/MIN: 10 INJECTION, EMULSION INTRAVENOUS at 07:51

## 2025-01-01 RX ADMIN — GABAPENTIN 200 MG: 100 CAPSULE ORAL at 19:25

## 2025-01-01 RX ADMIN — Medication 50 MCG: at 05:07

## 2025-01-01 RX ADMIN — ARFORMOTEROL TARTRATE 15 MCG: 15 SOLUTION RESPIRATORY (INHALATION) at 07:43

## 2025-01-01 RX ADMIN — LORAZEPAM 2 MG: 2 SOLUTION, CONCENTRATE ORAL at 06:07

## 2025-01-01 RX ADMIN — SODIUM CHLORIDE: 0.9 INJECTION, SOLUTION INTRAVENOUS at 21:54

## 2025-01-01 RX ADMIN — GABAPENTIN 200 MG: 100 CAPSULE ORAL at 20:32

## 2025-01-01 RX ADMIN — SODIUM CHLORIDE, PRESERVATIVE FREE 10 ML: 5 INJECTION INTRAVENOUS at 08:30

## 2025-01-01 RX ADMIN — FAMOTIDINE 20 MG: 10 INJECTION, SOLUTION INTRAVENOUS at 19:47

## 2025-01-01 RX ADMIN — GABAPENTIN 200 MG: 100 CAPSULE ORAL at 13:37

## 2025-01-01 RX ADMIN — FAMOTIDINE 20 MG: 10 INJECTION, SOLUTION INTRAVENOUS at 08:44

## 2025-01-01 RX ADMIN — GUAIFENESIN 1200 MG: 600 TABLET ORAL at 08:44

## 2025-01-01 RX ADMIN — Medication 50 MCG: at 11:02

## 2025-01-01 RX ADMIN — SODIUM CHLORIDE, PRESERVATIVE FREE 10 ML: 5 INJECTION INTRAVENOUS at 08:56

## 2025-01-01 RX ADMIN — PIPERACILLIN AND TAZOBACTAM 4500 MG: 4; .5 INJECTION, POWDER, FOR SOLUTION INTRAVENOUS at 06:50

## 2025-01-01 RX ADMIN — Medication 50 MCG: at 12:04

## 2025-01-01 RX ADMIN — Medication 50 MCG: at 15:18

## 2025-01-01 RX ADMIN — SODIUM CHLORIDE, SODIUM LACTATE, POTASSIUM CHLORIDE, AND CALCIUM CHLORIDE 250 ML: .6; .31; .03; .02 INJECTION, SOLUTION INTRAVENOUS at 17:10

## 2025-01-01 RX ADMIN — PIPERACILLIN AND TAZOBACTAM 3375 MG: 3; .375 INJECTION, POWDER, FOR SOLUTION INTRAVENOUS at 22:14

## 2025-01-01 RX ADMIN — ETOMIDATE INJECTION 24.6 MG: 2 SOLUTION INTRAVENOUS at 11:51

## 2025-01-01 RX ADMIN — Medication 50 MCG: at 13:00

## 2025-01-01 RX ADMIN — PIPERACILLIN AND TAZOBACTAM 3375 MG: 3; .375 INJECTION, POWDER, FOR SOLUTION INTRAVENOUS at 06:03

## 2025-01-01 RX ADMIN — ENOXAPARIN SODIUM 40 MG: 100 INJECTION SUBCUTANEOUS at 12:29

## 2025-01-01 RX ADMIN — PIPERACILLIN AND TAZOBACTAM 3375 MG: 3; .375 INJECTION, POWDER, FOR SOLUTION INTRAVENOUS at 22:03

## 2025-01-01 RX ADMIN — GUAIFENESIN 1200 MG: 600 TABLET ORAL at 19:25

## 2025-01-01 RX ADMIN — GUAIFENESIN 1200 MG: 600 TABLET ORAL at 19:47

## 2025-01-01 RX ADMIN — IPRATROPIUM BROMIDE AND ALBUTEROL SULFATE 1 DOSE: 2.5; .5 SOLUTION RESPIRATORY (INHALATION) at 11:44

## 2025-01-01 RX ADMIN — AMLODIPINE BESYLATE 5 MG: 5 TABLET ORAL at 08:52

## 2025-01-01 RX ADMIN — FENTANYL CITRATE 75 MCG/HR: 0.05 INJECTION, SOLUTION INTRAMUSCULAR; INTRAVENOUS at 02:35

## 2025-01-01 RX ADMIN — EPOPROSTENOL 50 NG/KG/MIN: 1.5 INJECTION, POWDER, LYOPHILIZED, FOR SOLUTION INTRAVENOUS at 22:49

## 2025-01-01 RX ADMIN — ALBUTEROL SULFATE 2.5 MG: 2.5 SOLUTION RESPIRATORY (INHALATION) at 12:45

## 2025-01-01 RX ADMIN — LINEZOLID 600 MG: 600 INJECTION, SOLUTION INTRAVENOUS at 16:35

## 2025-01-01 RX ADMIN — IPRATROPIUM BROMIDE AND ALBUTEROL SULFATE 1 DOSE: 2.5; .5 SOLUTION RESPIRATORY (INHALATION) at 19:25

## 2025-01-01 RX ADMIN — BUDESONIDE INHALATION 500 MCG: 0.5 SUSPENSION RESPIRATORY (INHALATION) at 10:55

## 2025-01-01 RX ADMIN — VANCOMYCIN HYDROCHLORIDE 2000 MG: 10 INJECTION, POWDER, LYOPHILIZED, FOR SOLUTION INTRAVENOUS at 08:47

## 2025-01-01 RX ADMIN — Medication 4 ML: at 10:55

## 2025-01-01 RX ADMIN — ENOXAPARIN SODIUM 40 MG: 100 INJECTION SUBCUTANEOUS at 11:29

## 2025-01-01 RX ADMIN — IPRATROPIUM BROMIDE AND ALBUTEROL SULFATE 1 DOSE: 2.5; .5 SOLUTION RESPIRATORY (INHALATION) at 07:41

## 2025-01-01 RX ADMIN — PIPERACILLIN AND TAZOBACTAM 3375 MG: 3; .375 INJECTION, POWDER, FOR SOLUTION INTRAVENOUS at 16:39

## 2025-01-01 RX ADMIN — BUDESONIDE INHALATION 500 MCG: 0.5 SUSPENSION RESPIRATORY (INHALATION) at 07:25

## 2025-01-01 RX ADMIN — IPRATROPIUM BROMIDE AND ALBUTEROL SULFATE 1 DOSE: 2.5; .5 SOLUTION RESPIRATORY (INHALATION) at 07:24

## 2025-01-01 RX ADMIN — FAMOTIDINE 20 MG: 10 INJECTION, SOLUTION INTRAVENOUS at 08:24

## 2025-01-01 RX ADMIN — IPRATROPIUM BROMIDE AND ALBUTEROL SULFATE 1 DOSE: 2.5; .5 SOLUTION RESPIRATORY (INHALATION) at 20:40

## 2025-01-01 RX ADMIN — MIDAZOLAM HYDROCHLORIDE 2 MG: 1 INJECTION, SOLUTION INTRAMUSCULAR; INTRAVENOUS at 13:41

## 2025-01-01 RX ADMIN — BUDESONIDE INHALATION 500 MCG: 0.5 SUSPENSION RESPIRATORY (INHALATION) at 19:25

## 2025-01-01 RX ADMIN — LORAZEPAM 2 MG: 2 SOLUTION, CONCENTRATE ORAL at 09:00

## 2025-01-01 RX ADMIN — PIPERACILLIN AND TAZOBACTAM 3375 MG: 3; .375 INJECTION, POWDER, FOR SOLUTION INTRAVENOUS at 13:47

## 2025-01-01 RX ADMIN — NOREPINEPHRINE BITARTRATE 15 MCG/MIN: 1 INJECTION, SOLUTION, CONCENTRATE INTRAVENOUS at 12:56

## 2025-01-01 RX ADMIN — IPRATROPIUM BROMIDE AND ALBUTEROL SULFATE 1 DOSE: 2.5; .5 SOLUTION RESPIRATORY (INHALATION) at 19:23

## 2025-01-01 RX ADMIN — PROPOFOL 40 MCG/KG/MIN: 10 INJECTION, EMULSION INTRAVENOUS at 22:17

## 2025-01-01 RX ADMIN — Medication 4 ML: at 19:18

## 2025-01-01 RX ADMIN — IPRATROPIUM BROMIDE AND ALBUTEROL SULFATE 1 DOSE: 2.5; .5 SOLUTION RESPIRATORY (INHALATION) at 19:44

## 2025-01-01 RX ADMIN — SODIUM CHLORIDE, PRESERVATIVE FREE 10 ML: 5 INJECTION INTRAVENOUS at 19:47

## 2025-01-01 RX ADMIN — LORAZEPAM 2 MG: 2 SOLUTION, CONCENTRATE ORAL at 10:42

## 2025-01-01 RX ADMIN — LINEZOLID 600 MG: 600 INJECTION, SOLUTION INTRAVENOUS at 03:37

## 2025-01-01 RX ADMIN — Medication 50 MCG: at 03:44

## 2025-01-01 RX ADMIN — PROPOFOL 20 MCG/KG/MIN: 10 INJECTION, EMULSION INTRAVENOUS at 11:53

## 2025-01-01 RX ADMIN — ENOXAPARIN SODIUM 40 MG: 100 INJECTION SUBCUTANEOUS at 10:41

## 2025-01-01 RX ADMIN — SODIUM CHLORIDE, PRESERVATIVE FREE 10 ML: 5 INJECTION INTRAVENOUS at 20:33

## 2025-01-01 RX ADMIN — LORAZEPAM 2 MG: 2 SOLUTION, CONCENTRATE ORAL at 19:26

## 2025-01-01 RX ADMIN — SODIUM CHLORIDE 1000 ML: 0.9 INJECTION, SOLUTION INTRAVENOUS at 06:51

## 2025-01-01 RX ADMIN — ALBUTEROL SULFATE 2.5 MG: 2.5 SOLUTION RESPIRATORY (INHALATION) at 02:06

## 2025-01-01 RX ADMIN — ARFORMOTEROL TARTRATE 15 MCG: 15 SOLUTION RESPIRATORY (INHALATION) at 07:16

## 2025-01-01 RX ADMIN — PIPERACILLIN AND TAZOBACTAM 3375 MG: 3; .375 INJECTION, POWDER, FOR SOLUTION INTRAVENOUS at 22:19

## 2025-01-01 RX ADMIN — Medication 50 MCG: at 10:03

## 2025-01-01 RX ADMIN — MIDAZOLAM HYDROCHLORIDE 2 MG: 1 INJECTION, SOLUTION INTRAMUSCULAR; INTRAVENOUS at 07:42

## 2025-01-01 RX ADMIN — LINEZOLID 600 MG: 600 INJECTION, SOLUTION INTRAVENOUS at 03:34

## 2025-01-01 RX ADMIN — MIDAZOLAM HYDROCHLORIDE 2 MG: 1 INJECTION, SOLUTION INTRAMUSCULAR; INTRAVENOUS at 20:32

## 2025-01-01 RX ADMIN — IPRATROPIUM BROMIDE AND ALBUTEROL SULFATE 1 DOSE: 2.5; .5 SOLUTION RESPIRATORY (INHALATION) at 19:19

## 2025-01-01 RX ADMIN — PROPOFOL 40 MCG/KG/MIN: 10 INJECTION, EMULSION INTRAVENOUS at 03:53

## 2025-01-01 RX ADMIN — SODIUM CHLORIDE, PRESERVATIVE FREE 10 ML: 5 INJECTION INTRAVENOUS at 19:42

## 2025-01-01 RX ADMIN — GABAPENTIN 200 MG: 100 CAPSULE ORAL at 19:47

## 2025-01-01 RX ADMIN — ARFORMOTEROL TARTRATE 15 MCG: 15 SOLUTION RESPIRATORY (INHALATION) at 19:23

## 2025-01-01 RX ADMIN — GABAPENTIN 200 MG: 100 CAPSULE ORAL at 14:12

## 2025-01-01 RX ADMIN — LINEZOLID 600 MG: 600 INJECTION, SOLUTION INTRAVENOUS at 16:24

## 2025-01-01 RX ADMIN — SODIUM CHLORIDE 8 ML/HR: 0.9 INJECTION, SOLUTION INTRAVENOUS at 22:49

## 2025-01-01 RX ADMIN — IPRATROPIUM BROMIDE AND ALBUTEROL SULFATE 1 DOSE: 2.5; .5 SOLUTION RESPIRATORY (INHALATION) at 10:55

## 2025-01-01 RX ADMIN — IPRATROPIUM BROMIDE AND ALBUTEROL SULFATE 1 DOSE: 2.5; .5 SOLUTION RESPIRATORY (INHALATION) at 06:52

## 2025-01-01 RX ADMIN — IPRATROPIUM BROMIDE AND ALBUTEROL SULFATE 1 DOSE: 2.5; .5 SOLUTION RESPIRATORY (INHALATION) at 07:16

## 2025-01-01 RX ADMIN — FUROSEMIDE 20 MG: 10 INJECTION, SOLUTION INTRAMUSCULAR; INTRAVENOUS at 20:26

## 2025-01-01 RX ADMIN — FAMOTIDINE 20 MG: 10 INJECTION, SOLUTION INTRAVENOUS at 08:27

## 2025-01-01 RX ADMIN — MORPHINE SULFATE 5 MG: 4 INJECTION INTRAVENOUS at 14:53

## 2025-01-01 RX ADMIN — NOREPINEPHRINE BITARTRATE 5 MCG/MIN: 1 INJECTION, SOLUTION, CONCENTRATE INTRAVENOUS at 11:59

## 2025-01-01 RX ADMIN — FENTANYL CITRATE 75 MCG/HR: 0.05 INJECTION, SOLUTION INTRAMUSCULAR; INTRAVENOUS at 22:16

## 2025-01-01 RX ADMIN — ARFORMOTEROL TARTRATE 15 MCG: 15 SOLUTION RESPIRATORY (INHALATION) at 10:55

## 2025-01-01 RX ADMIN — PIPERACILLIN AND TAZOBACTAM 3375 MG: 3; .375 INJECTION, POWDER, FOR SOLUTION INTRAVENOUS at 07:16

## 2025-01-01 RX ADMIN — SODIUM CHLORIDE, PRESERVATIVE FREE 10 ML: 5 INJECTION INTRAVENOUS at 08:00

## 2025-01-01 RX ADMIN — SODIUM CHLORIDE, PRESERVATIVE FREE 10 ML: 5 INJECTION INTRAVENOUS at 19:26

## 2025-01-01 RX ADMIN — IPRATROPIUM BROMIDE AND ALBUTEROL SULFATE 1 DOSE: .5; 3 SOLUTION RESPIRATORY (INHALATION) at 11:35

## 2025-01-01 RX ADMIN — DEXMEDETOMIDINE 1.4 MCG/KG/HR: 100 INJECTION, SOLUTION INTRAVENOUS at 16:45

## 2025-01-01 RX ADMIN — LORAZEPAM 2 MG: 2 SOLUTION, CONCENTRATE ORAL at 19:42

## 2025-01-01 RX ADMIN — MORPHINE SULFATE 5 MG: 4 INJECTION INTRAVENOUS at 04:33

## 2025-01-01 RX ADMIN — SODIUM CHLORIDE, PRESERVATIVE FREE 10 ML: 5 INJECTION INTRAVENOUS at 08:53

## 2025-01-01 RX ADMIN — FAMOTIDINE 20 MG: 10 INJECTION, SOLUTION INTRAVENOUS at 20:33

## 2025-01-01 RX ADMIN — LORAZEPAM 2 MG: 2 SOLUTION, CONCENTRATE ORAL at 19:46

## 2025-01-01 RX ADMIN — IPRATROPIUM BROMIDE AND ALBUTEROL SULFATE 1 DOSE: 2.5; .5 SOLUTION RESPIRATORY (INHALATION) at 07:43

## 2025-01-01 RX ADMIN — PIPERACILLIN AND TAZOBACTAM 3375 MG: 3; .375 INJECTION, POWDER, FOR SOLUTION INTRAVENOUS at 06:44

## 2025-01-01 RX ADMIN — BUDESONIDE INHALATION 500 MCG: 0.5 SUSPENSION RESPIRATORY (INHALATION) at 20:40

## 2025-01-01 RX ADMIN — Medication 50 MCG: at 22:11

## 2025-01-01 RX ADMIN — IPRATROPIUM BROMIDE AND ALBUTEROL SULFATE 1 DOSE: 2.5; .5 SOLUTION RESPIRATORY (INHALATION) at 15:56

## 2025-01-01 RX ADMIN — GABAPENTIN 200 MG: 100 CAPSULE ORAL at 19:53

## 2025-01-01 RX ADMIN — Medication 4 ML: at 07:16

## 2025-01-01 RX ADMIN — Medication 4 ML: at 19:23

## 2025-01-01 RX ADMIN — BUDESONIDE INHALATION 500 MCG: 0.5 SUSPENSION RESPIRATORY (INHALATION) at 07:16

## 2025-01-01 RX ADMIN — ENOXAPARIN SODIUM 40 MG: 100 INJECTION SUBCUTANEOUS at 10:39

## 2025-01-01 RX ADMIN — ARFORMOTEROL TARTRATE 15 MCG: 15 SOLUTION RESPIRATORY (INHALATION) at 19:18

## 2025-01-01 RX ADMIN — PIPERACILLIN AND TAZOBACTAM 3375 MG: 3; .375 INJECTION, POWDER, FOR SOLUTION INTRAVENOUS at 14:43

## 2025-01-01 RX ADMIN — LINEZOLID 600 MG: 600 INJECTION, SOLUTION INTRAVENOUS at 16:00

## 2025-01-01 RX ADMIN — NOREPINEPHRINE BITARTRATE 5 MCG/MIN: 1 INJECTION, SOLUTION, CONCENTRATE INTRAVENOUS at 19:16

## 2025-01-01 RX ADMIN — BUDESONIDE INHALATION 500 MCG: 0.5 SUSPENSION RESPIRATORY (INHALATION) at 07:41

## 2025-01-01 RX ADMIN — FAMOTIDINE 20 MG: 10 INJECTION, SOLUTION INTRAVENOUS at 08:53

## 2025-01-01 RX ADMIN — ARFORMOTEROL TARTRATE 15 MCG: 15 SOLUTION RESPIRATORY (INHALATION) at 20:40

## 2025-01-01 RX ADMIN — LINEZOLID 600 MG: 600 INJECTION, SOLUTION INTRAVENOUS at 04:41

## 2025-01-01 RX ADMIN — PIPERACILLIN AND TAZOBACTAM 3375 MG: 3; .375 INJECTION, POWDER, FOR SOLUTION INTRAVENOUS at 22:20

## 2025-01-01 RX ADMIN — FENTANYL CITRATE 75 MCG/HR: 0.05 INJECTION, SOLUTION INTRAMUSCULAR; INTRAVENOUS at 10:35

## 2025-01-01 RX ADMIN — MIDAZOLAM HYDROCHLORIDE 2 MG: 1 INJECTION, SOLUTION INTRAMUSCULAR; INTRAVENOUS at 04:50

## 2025-01-01 RX ADMIN — FENTANYL CITRATE 50 MCG: 50 INJECTION INTRAMUSCULAR; INTRAVENOUS at 11:48

## 2025-01-01 ASSESSMENT — PAIN SCALES - GENERAL
PAINLEVEL_OUTOF10: 0
PAINLEVEL_OUTOF10: 6
PAINLEVEL_OUTOF10: 7
PAINLEVEL_OUTOF10: 0
PAINLEVEL_OUTOF10: 5
PAINLEVEL_OUTOF10: 0
PAINLEVEL_OUTOF10: 5
PAINLEVEL_OUTOF10: 10
PAINLEVEL_OUTOF10: 0
PAINLEVEL_OUTOF10: 8
PAINLEVEL_OUTOF10: 0
PAINLEVEL_OUTOF10: 6
PAINLEVEL_OUTOF10: 0

## 2025-01-01 ASSESSMENT — PULMONARY FUNCTION TESTS
PIF_VALUE: 43
PIF_VALUE: 41
PIF_VALUE: 41
PIF_VALUE: 33
PEFR_L/MIN: 39
PIF_VALUE: 34
PIF_VALUE: 40
PIF_VALUE: 37
PIF_VALUE: 38
PIF_VALUE: 40
PIF_VALUE: 40
PIF_VALUE: 41
PIF_VALUE: 36
PIF_VALUE: 44
PIF_VALUE: 42
PIF_VALUE: 44
PIF_VALUE: 51
PIF_VALUE: 38
PIF_VALUE: 42
PIF_VALUE: 45
PIF_VALUE: 39
PIF_VALUE: 35
PIF_VALUE: 42
PIF_VALUE: 42
PIF_VALUE: 39
PIF_VALUE: 8
PIF_VALUE: 44
PIF_VALUE: 38
PIF_VALUE: 45

## 2025-01-01 ASSESSMENT — LIFESTYLE VARIABLES
HOW OFTEN DO YOU HAVE A DRINK CONTAINING ALCOHOL: NEVER
HOW MANY STANDARD DRINKS CONTAINING ALCOHOL DO YOU HAVE ON A TYPICAL DAY: PATIENT DOES NOT DRINK

## 2025-01-01 ASSESSMENT — PAIN DESCRIPTION - DESCRIPTORS: DESCRIPTORS: ACHING

## 2025-01-01 ASSESSMENT — PAIN DESCRIPTION - LOCATION
LOCATION: GENERALIZED
LOCATION: BACK

## 2025-01-01 ASSESSMENT — PAIN - FUNCTIONAL ASSESSMENT: PAIN_FUNCTIONAL_ASSESSMENT: NONE - DENIES PAIN

## 2025-05-25 ENCOUNTER — HOSPITAL ENCOUNTER (EMERGENCY)
Age: 60
Discharge: HOME | End: 2025-05-26
Attending: EMERGENCY MEDICINE
Payer: COMMERCIAL

## 2025-05-25 ENCOUNTER — APPOINTMENT (OUTPATIENT)
Dept: CT IMAGING | Age: 60
End: 2025-05-25
Payer: COMMERCIAL

## 2025-05-25 ENCOUNTER — APPOINTMENT (OUTPATIENT)
Dept: GENERAL RADIOLOGY | Age: 60
End: 2025-05-25
Payer: COMMERCIAL

## 2025-05-25 DIAGNOSIS — R06.2 WHEEZING: Primary | ICD-10-CM

## 2025-05-25 DIAGNOSIS — R05.2 SUBACUTE COUGH: ICD-10-CM

## 2025-05-25 DIAGNOSIS — C34.90 MALIGNANT NEOPLASM OF LUNG, UNSPECIFIED LATERALITY, UNSPECIFIED PART OF LUNG (HCC): ICD-10-CM

## 2025-05-25 LAB
ALBUMIN SERPL-MCNC: 3.1 G/DL (ref 3.5–5)
ALBUMIN/GLOB SERPL: 0.6 (ref 1–1.9)
ALP SERPL-CCNC: 78 U/L (ref 35–104)
ALT SERPL-CCNC: 13 U/L (ref 8–45)
ANION GAP SERPL CALC-SCNC: 10 MMOL/L (ref 7–16)
AST SERPL-CCNC: 24 U/L (ref 15–37)
BASOPHILS # BLD: 0.01 K/UL (ref 0–0.2)
BASOPHILS NFR BLD: 0.1 % (ref 0–2)
BILIRUB SERPL-MCNC: 0.5 MG/DL (ref 0–1.2)
BUN SERPL-MCNC: 14 MG/DL (ref 6–23)
CALCIUM SERPL-MCNC: 9.5 MG/DL (ref 8.8–10.2)
CHLORIDE SERPL-SCNC: 97 MMOL/L (ref 98–107)
CO2 SERPL-SCNC: 25 MMOL/L (ref 20–29)
CREAT SERPL-MCNC: 1.45 MG/DL (ref 0.6–1.1)
D DIMER PPP FEU-MCNC: 2.02 UG/ML(FEU)
DIFFERENTIAL METHOD BLD: ABNORMAL
EOSINOPHIL # BLD: 0.06 K/UL (ref 0–0.8)
EOSINOPHIL NFR BLD: 0.8 % (ref 0.5–7.8)
ERYTHROCYTE [DISTWIDTH] IN BLOOD BY AUTOMATED COUNT: 17.8 % (ref 11.9–14.6)
FLUAV RNA SPEC QL NAA+PROBE: NOT DETECTED
FLUBV RNA SPEC QL NAA+PROBE: NOT DETECTED
GLOBULIN SER CALC-MCNC: 5.2 G/DL (ref 2.3–3.5)
GLUCOSE SERPL-MCNC: 106 MG/DL (ref 70–99)
HCT VFR BLD AUTO: 34.1 % (ref 35.8–46.3)
HGB BLD-MCNC: 10.5 G/DL (ref 11.7–15.4)
IMM GRANULOCYTES # BLD AUTO: 0.04 K/UL (ref 0–0.5)
IMM GRANULOCYTES NFR BLD AUTO: 0.5 % (ref 0–5)
LACTATE SERPL-SCNC: 1 MMOL/L (ref 0.5–2)
LIPASE SERPL-CCNC: 37 U/L (ref 13–60)
LYMPHOCYTES # BLD: 0.89 K/UL (ref 0.5–4.6)
LYMPHOCYTES NFR BLD: 11.6 % (ref 13–44)
MAGNESIUM SERPL-MCNC: 2.4 MG/DL (ref 1.8–2.4)
MCH RBC QN AUTO: 27.3 PG (ref 26.1–32.9)
MCHC RBC AUTO-ENTMCNC: 30.8 G/DL (ref 31.4–35)
MCV RBC AUTO: 88.6 FL (ref 82–102)
MONOCYTES # BLD: 0.87 K/UL (ref 0.1–1.3)
MONOCYTES NFR BLD: 11.4 % (ref 4–12)
NEUTS SEG # BLD: 5.77 K/UL (ref 1.7–8.2)
NEUTS SEG NFR BLD: 75.6 % (ref 43–78)
NRBC # BLD: 0.02 K/UL (ref 0–0.2)
NT PRO BNP: <36 PG/ML (ref 0–125)
PLATELET # BLD AUTO: 203 K/UL (ref 150–450)
PMV BLD AUTO: 9.3 FL (ref 9.4–12.3)
POTASSIUM SERPL-SCNC: 4.2 MMOL/L (ref 3.5–5.1)
PROCALCITONIN SERPL-MCNC: 0.08 NG/ML (ref 0–0.1)
PROT SERPL-MCNC: 8.4 G/DL (ref 6.3–8.2)
RBC # BLD AUTO: 3.85 M/UL (ref 4.05–5.2)
SARS-COV-2 RDRP RESP QL NAA+PROBE: NOT DETECTED
SODIUM SERPL-SCNC: 132 MMOL/L (ref 136–145)
SOURCE: NORMAL
TROPONIN T SERPL HS-MCNC: <6 NG/L (ref 0–14)
WBC # BLD AUTO: 7.6 K/UL (ref 4.3–11.1)

## 2025-05-25 PROCEDURE — 85379 FIBRIN DEGRADATION QUANT: CPT

## 2025-05-25 PROCEDURE — 83880 ASSAY OF NATRIURETIC PEPTIDE: CPT

## 2025-05-25 PROCEDURE — 2580000003 HC RX 258: Performed by: EMERGENCY MEDICINE

## 2025-05-25 PROCEDURE — 84145 PROCALCITONIN (PCT): CPT

## 2025-05-25 PROCEDURE — 83605 ASSAY OF LACTIC ACID: CPT

## 2025-05-25 PROCEDURE — 83690 ASSAY OF LIPASE: CPT

## 2025-05-25 PROCEDURE — 71046 X-RAY EXAM CHEST 2 VIEWS: CPT

## 2025-05-25 PROCEDURE — 84484 ASSAY OF TROPONIN QUANT: CPT

## 2025-05-25 PROCEDURE — 85025 COMPLETE CBC W/AUTO DIFF WBC: CPT

## 2025-05-25 PROCEDURE — 83735 ASSAY OF MAGNESIUM: CPT

## 2025-05-25 PROCEDURE — 99285 EMERGENCY DEPT VISIT HI MDM: CPT

## 2025-05-25 PROCEDURE — 80053 COMPREHEN METABOLIC PANEL: CPT

## 2025-05-25 PROCEDURE — 87636 SARSCOV2 & INF A&B AMP PRB: CPT

## 2025-05-25 RX ORDER — 0.9 % SODIUM CHLORIDE 0.9 %
1000 INTRAVENOUS SOLUTION INTRAVENOUS ONCE
Status: COMPLETED | OUTPATIENT
Start: 2025-05-25 | End: 2025-05-25

## 2025-05-25 RX ADMIN — SODIUM CHLORIDE 1000 ML: 0.9 INJECTION, SOLUTION INTRAVENOUS at 23:02

## 2025-05-25 ASSESSMENT — PAIN - FUNCTIONAL ASSESSMENT: PAIN_FUNCTIONAL_ASSESSMENT: NONE - DENIES PAIN

## 2025-05-26 ENCOUNTER — APPOINTMENT (OUTPATIENT)
Dept: CT IMAGING | Age: 60
End: 2025-05-26
Payer: COMMERCIAL

## 2025-05-26 VITALS
HEART RATE: 123 BPM | HEIGHT: 66 IN | WEIGHT: 204 LBS | BODY MASS INDEX: 32.78 KG/M2 | OXYGEN SATURATION: 97 % | TEMPERATURE: 98.4 F | DIASTOLIC BLOOD PRESSURE: 99 MMHG | SYSTOLIC BLOOD PRESSURE: 147 MMHG | RESPIRATION RATE: 18 BRPM

## 2025-05-26 PROCEDURE — 94640 AIRWAY INHALATION TREATMENT: CPT

## 2025-05-26 PROCEDURE — 6370000000 HC RX 637 (ALT 250 FOR IP): Performed by: EMERGENCY MEDICINE

## 2025-05-26 PROCEDURE — 6360000002 HC RX W HCPCS: Performed by: EMERGENCY MEDICINE

## 2025-05-26 PROCEDURE — 6360000004 HC RX CONTRAST MEDICATION: Performed by: EMERGENCY MEDICINE

## 2025-05-26 PROCEDURE — 71260 CT THORAX DX C+: CPT

## 2025-05-26 PROCEDURE — 2500000003 HC RX 250 WO HCPCS: Performed by: EMERGENCY MEDICINE

## 2025-05-26 PROCEDURE — 96374 THER/PROPH/DIAG INJ IV PUSH: CPT

## 2025-05-26 RX ORDER — IPRATROPIUM BROMIDE AND ALBUTEROL SULFATE 2.5; .5 MG/3ML; MG/3ML
1 SOLUTION RESPIRATORY (INHALATION)
Status: DISCONTINUED | OUTPATIENT
Start: 2025-05-26 | End: 2025-05-26 | Stop reason: HOSPADM

## 2025-05-26 RX ORDER — ALBUTEROL SULFATE 0.83 MG/ML
5 SOLUTION RESPIRATORY (INHALATION)
Status: COMPLETED | OUTPATIENT
Start: 2025-05-26 | End: 2025-05-26

## 2025-05-26 RX ORDER — ALBUTEROL SULFATE 90 UG/1
2 INHALANT RESPIRATORY (INHALATION) 4 TIMES DAILY PRN
Qty: 18 G | Refills: 1 | Status: SHIPPED | OUTPATIENT
Start: 2025-05-26

## 2025-05-26 RX ORDER — IOPAMIDOL 755 MG/ML
100 INJECTION, SOLUTION INTRAVASCULAR
Status: COMPLETED | OUTPATIENT
Start: 2025-05-26 | End: 2025-05-26

## 2025-05-26 RX ORDER — PREDNISONE 20 MG/1
TABLET ORAL
Qty: 12 TABLET | Refills: 0 | Status: SHIPPED | OUTPATIENT
Start: 2025-05-26

## 2025-05-26 RX ADMIN — METHYLPREDNISOLONE SODIUM SUCCINATE 80 MG: 125 INJECTION INTRAMUSCULAR; INTRAVENOUS at 01:30

## 2025-05-26 RX ADMIN — ALBUTEROL SULFATE 5 MG: 2.5 SOLUTION RESPIRATORY (INHALATION) at 01:26

## 2025-05-26 RX ADMIN — IOPAMIDOL 100 ML: 755 INJECTION, SOLUTION INTRAVENOUS at 00:13

## 2025-05-26 RX ADMIN — IPRATROPIUM BROMIDE AND ALBUTEROL SULFATE 1 DOSE: 2.5; .5 SOLUTION RESPIRATORY (INHALATION) at 01:10

## 2025-05-26 NOTE — ED PROVIDER NOTES
Emergency Department Provider Note       [facility] EMERGENCY DEPT   PCP: Tracy Turner MD   Age: 59 y.o.   Sex: female     DISPOSITION Decision To Discharge 05/26/2025 01:52:31 AM    ICD-10-CM    1. Wheezing  R06.2       2. Subacute cough  R05.2       3. Malignant neoplasm of lung, unspecified laterality, unspecified part of lung (HCC)  C34.90           Medical Decision Making     Pt comes to the ED for evaluation of increasing SOB (dyspnea on exertion) and coughing for the past 4 weeks.  Pt currently undergoing chemotherapy for lung cancer.  She denies CP or leg pain.  No hx of PE or DVT.  Pt denies F/C.     Pt with tachycardia and HTN.  Afebrile. Pt without respiratory distress on exam.  Wheezing present.     CBC without leukocytosis.  Anemia at baseline.  COVID and influenza negative.  CMP unremarkable.  Lactic within normal limits.  Troponin within normal limits.  BNP within normal limits.  Procalcitonin within normal limits.  Magnesium within normal limits.    CXR with lung mass.    CT Chest:  IMPRESSION:  1.  Suboptimal opacification of the pulmonary arteries relative to the aorta and  pulmonary veins precluding exclusion of subtle PE. No gross pulmonary embolism  is seen.  2.  Partial right pneumonectomy. Bilateral intrapulmonary masses concerning for  metastatic disease. Mediastinal and bilateral hilar lymphadenopathy. Right hilar  mass as described above is not excluded.  3.  Incompletely evaluated multiple hepatic hypodensities at least in part  likely benign simple cysts. Metastatic disease to the liver is not excluded.  4.  Partially imaged left hydronephrosis and proximal left hydroureter.  5.  This study was marked for result tracking.    After duoneb, pt with improved symptoms, however, wheezing improved.  2nd albuterol treatment ordered.  80mg of solumedrol ordered.  HR improved to 110.  Pt given spacer.  Will dc home with Rx for albuterol and steroid taper.  Patient stable for DC home at this

## 2025-05-26 NOTE — ED NOTES
Patient mobility status  with no difficulty.     I have reviewed discharge instructions with the patient.  The patient verbalized understanding.    Patient left ED via Discharge Method: ambulatory to Home with Extended Family:.    Opportunity for questions and clarification provided.     Patient given 2 scripts.

## 2025-07-03 ENCOUNTER — HOSPITAL ENCOUNTER (EMERGENCY)
Age: 60
Discharge: HOME | End: 2025-07-03
Attending: EMERGENCY MEDICINE
Payer: MEDICARE

## 2025-07-03 ENCOUNTER — APPOINTMENT (OUTPATIENT)
Dept: CT IMAGING | Age: 60
End: 2025-07-03
Payer: MEDICARE

## 2025-07-03 VITALS
TEMPERATURE: 99.1 F | HEART RATE: 120 BPM | BODY MASS INDEX: 29.73 KG/M2 | OXYGEN SATURATION: 93 % | RESPIRATION RATE: 18 BRPM | DIASTOLIC BLOOD PRESSURE: 97 MMHG | HEIGHT: 66 IN | WEIGHT: 185 LBS | SYSTOLIC BLOOD PRESSURE: 135 MMHG

## 2025-07-03 DIAGNOSIS — G89.3 CANCER ASSOCIATED PAIN: Primary | ICD-10-CM

## 2025-07-03 LAB
ALBUMIN SERPL-MCNC: 2.3 G/DL (ref 3.5–5)
ALBUMIN/GLOB SERPL: 0.4 (ref 1–1.9)
ALP SERPL-CCNC: 100 U/L (ref 35–104)
ALT SERPL-CCNC: 30 U/L (ref 8–45)
ANION GAP SERPL CALC-SCNC: 16 MMOL/L (ref 7–16)
AST SERPL-CCNC: 39 U/L (ref 15–37)
BASOPHILS # BLD: 0.07 K/UL (ref 0–0.2)
BASOPHILS NFR BLD: 0.6 % (ref 0–2)
BILIRUB SERPL-MCNC: 0.4 MG/DL (ref 0–1.2)
BUN SERPL-MCNC: 19 MG/DL (ref 6–23)
CALCIUM SERPL-MCNC: 9.2 MG/DL (ref 8.8–10.2)
CHLORIDE SERPL-SCNC: 95 MMOL/L (ref 98–107)
CO2 SERPL-SCNC: 24 MMOL/L (ref 20–29)
CREAT SERPL-MCNC: 1.25 MG/DL (ref 0.6–1.1)
DIFFERENTIAL METHOD BLD: ABNORMAL
EOSINOPHIL # BLD: 0.25 K/UL (ref 0–0.8)
EOSINOPHIL NFR BLD: 2.3 % (ref 0.5–7.8)
ERYTHROCYTE [DISTWIDTH] IN BLOOD BY AUTOMATED COUNT: 17.3 % (ref 11.9–14.6)
GLOBULIN SER CALC-MCNC: 6.2 G/DL (ref 2.3–3.5)
GLUCOSE SERPL-MCNC: 113 MG/DL (ref 70–99)
HCT VFR BLD AUTO: 38.6 % (ref 35.8–46.3)
HGB BLD-MCNC: 12.1 G/DL (ref 11.7–15.4)
IMM GRANULOCYTES # BLD AUTO: 0.1 K/UL (ref 0–0.5)
IMM GRANULOCYTES NFR BLD AUTO: 0.9 % (ref 0–5)
LACTATE SERPL-SCNC: 2 MMOL/L (ref 0.5–2)
LYMPHOCYTES # BLD: 0.85 K/UL (ref 0.5–4.6)
LYMPHOCYTES NFR BLD: 7.9 % (ref 13–44)
MCH RBC QN AUTO: 27 PG (ref 26.1–32.9)
MCHC RBC AUTO-ENTMCNC: 31.3 G/DL (ref 31.4–35)
MCV RBC AUTO: 86.2 FL (ref 82–102)
MONOCYTES # BLD: 2.02 K/UL (ref 0.1–1.3)
MONOCYTES NFR BLD: 18.7 % (ref 4–12)
NEUTS SEG # BLD: 7.5 K/UL (ref 1.7–8.2)
NEUTS SEG NFR BLD: 69.6 % (ref 43–78)
NRBC # BLD: 0 K/UL (ref 0–0.2)
PLATELET # BLD AUTO: 381 K/UL (ref 150–450)
PMV BLD AUTO: 8.7 FL (ref 9.4–12.3)
POTASSIUM SERPL-SCNC: 4.2 MMOL/L (ref 3.5–5.1)
PROT SERPL-MCNC: 8.5 G/DL (ref 6.3–8.2)
RBC # BLD AUTO: 4.48 M/UL (ref 4.05–5.2)
SODIUM SERPL-SCNC: 135 MMOL/L (ref 136–145)
WBC # BLD AUTO: 10.8 K/UL (ref 4.3–11.1)

## 2025-07-03 PROCEDURE — 2580000003 HC RX 258: Performed by: EMERGENCY MEDICINE

## 2025-07-03 PROCEDURE — 99284 EMERGENCY DEPT VISIT MOD MDM: CPT

## 2025-07-03 PROCEDURE — 6360000002 HC RX W HCPCS: Performed by: EMERGENCY MEDICINE

## 2025-07-03 PROCEDURE — 80053 COMPREHEN METABOLIC PANEL: CPT

## 2025-07-03 PROCEDURE — 96375 TX/PRO/DX INJ NEW DRUG ADDON: CPT

## 2025-07-03 PROCEDURE — 6370000000 HC RX 637 (ALT 250 FOR IP): Performed by: EMERGENCY MEDICINE

## 2025-07-03 PROCEDURE — 74176 CT ABD & PELVIS W/O CONTRAST: CPT

## 2025-07-03 PROCEDURE — 83605 ASSAY OF LACTIC ACID: CPT

## 2025-07-03 PROCEDURE — 96361 HYDRATE IV INFUSION ADD-ON: CPT

## 2025-07-03 PROCEDURE — 96374 THER/PROPH/DIAG INJ IV PUSH: CPT

## 2025-07-03 PROCEDURE — 85025 COMPLETE CBC W/AUTO DIFF WBC: CPT

## 2025-07-03 RX ORDER — OXYCODONE HYDROCHLORIDE 5 MG/1
5 TABLET ORAL
Refills: 0 | Status: COMPLETED | OUTPATIENT
Start: 2025-07-03 | End: 2025-07-03

## 2025-07-03 RX ORDER — ONDANSETRON 2 MG/ML
4 INJECTION INTRAMUSCULAR; INTRAVENOUS ONCE
Status: COMPLETED | OUTPATIENT
Start: 2025-07-03 | End: 2025-07-03

## 2025-07-03 RX ORDER — 0.9 % SODIUM CHLORIDE 0.9 %
1000 INTRAVENOUS SOLUTION INTRAVENOUS ONCE
Status: COMPLETED | OUTPATIENT
Start: 2025-07-03 | End: 2025-07-03

## 2025-07-03 RX ORDER — OXYCODONE HYDROCHLORIDE 5 MG/1
5 TABLET ORAL EVERY 6 HOURS PRN
Qty: 12 TABLET | Refills: 0 | Status: SHIPPED | OUTPATIENT
Start: 2025-07-03 | End: 2025-07-06

## 2025-07-03 RX ADMIN — FENTANYL CITRATE 50 MCG: 50 INJECTION INTRAMUSCULAR; INTRAVENOUS at 18:27

## 2025-07-03 RX ADMIN — OXYCODONE 5 MG: 5 TABLET ORAL at 20:13

## 2025-07-03 RX ADMIN — SODIUM CHLORIDE 1000 ML: 0.9 INJECTION, SOLUTION INTRAVENOUS at 18:12

## 2025-07-03 RX ADMIN — ONDANSETRON 4 MG: 2 INJECTION, SOLUTION INTRAMUSCULAR; INTRAVENOUS at 18:30

## 2025-07-03 ASSESSMENT — PAIN DESCRIPTION - LOCATION
LOCATION: ABDOMEN
LOCATION: FLANK

## 2025-07-03 ASSESSMENT — PAIN SCALES - GENERAL
PAINLEVEL_OUTOF10: 9
PAINLEVEL_OUTOF10: 10

## 2025-07-03 ASSESSMENT — PAIN - FUNCTIONAL ASSESSMENT: PAIN_FUNCTIONAL_ASSESSMENT: 0-10

## 2025-07-03 ASSESSMENT — PAIN DESCRIPTION - ORIENTATION
ORIENTATION: LEFT
ORIENTATION: LEFT

## 2025-07-03 ASSESSMENT — PAIN DESCRIPTION - DESCRIPTORS: DESCRIPTORS: THROBBING

## 2025-07-03 NOTE — ED PROVIDER NOTES
Emergency Department Provider Note       [facility] EMERGENCY DEPT   PCP: Tracy Turner MD   Age: 59 y.o.   Sex: female     DISPOSITION Decision To Discharge 07/03/2025 08:09:46 PM    ICD-10-CM    1. Cancer associated pain  G89.3 oxyCODONE (ROXICODONE) 5 MG immediate release tablet          Medical Decision Making     Patient is being evaluated for flank pain.  On arrival she had a blood pressure 97/82 and was tachycardic at 135 bpm.  Not febrile but temperature is 99.1.  Raises concern for the possibility of infection and/or sepsis.  Workup will be performed to include CAT scan to look for any complications related to the stent placement.  Patient is been ordered IV fluids.  ED Course as of 07/03/25 2012   Thu Jul 03, 2025 1854 Patient has no leukocytosis or left shift.  Lactic acid is normal. [ROHIT]   1856 Chronic kidney disease creatinine 1.25 overall improved from previous labs. [ROHIT]   1958 Radiologist called me about the patient's CT abdomen pelvis.  She apparently had widespread metastatic disease.  I looked at the patient's prior records and she has known metastatic disease with which she follows oncology for.  Otherwise they did not see any complications related to the recent procedure. [ROHIT]   1959 CT ABDOMEN PELVIS RENAL STONE  CT abdomen pelvis renal stone study negative for complication related to stent placement.  No acute infectious process. [ROHIT]   2001 Patient has been persistently tachycardic in the 120s.  Looking back through prior records she has been tachycardic in the 120s to 130s every visit with her oncologist for the past at least 6 visits going back 2 to 3 months. [ROHIT]      ED Course User Index  [ROHIT] Sherice Macdonald,      1 or more acute illnesses that pose a threat to life or bodily function.   Patient was discharged risks and benefits of hospitalization were considered.  Shared medical decision making was utilized in creating the patients health plan today.  I independently

## 2025-07-03 NOTE — ED NOTES
Patient oxygen dropped to 88% when coming back from CT. Put patient on 2L NC and oxygen came back up to mid 90'S

## 2025-07-04 NOTE — ED NOTES
Patient mobility status ambulates with no difficulty.     I have reviewed discharge instructions with the patient.  The patient verbalized understanding.    Patient left ED via Discharge Method: wheelchair to Home with Extended Family:.    Opportunity for questions and clarification provided.     Patient given 1 scripts.

## 2025-07-17 PROBLEM — E88.09 HYPOALBUMINEMIA: Status: ACTIVE | Noted: 2025-01-01

## 2025-07-17 PROBLEM — J96.01 ACUTE HYPOXIC RESPIRATORY FAILURE (HCC): Status: ACTIVE | Noted: 2025-07-17

## 2025-07-17 PROBLEM — A41.9 SEPSIS (HCC): Status: ACTIVE | Noted: 2025-07-17

## 2025-07-17 PROBLEM — C18.9 COLON CANCER METASTASIZED TO LUNG (HCC): Status: ACTIVE | Noted: 2025-01-01

## 2025-07-17 PROBLEM — C78.00 COLON CANCER METASTASIZED TO LUNG (HCC): Status: ACTIVE | Noted: 2025-07-17

## 2025-07-17 PROBLEM — J90 PLEURAL EFFUSION: Status: ACTIVE | Noted: 2025-01-01

## 2025-07-17 PROBLEM — E87.1 HYPONATREMIA: Status: ACTIVE | Noted: 2025-01-01

## 2025-07-17 PROBLEM — J18.9 PNEUMONIA INVOLVING RIGHT LUNG: Status: ACTIVE | Noted: 2025-01-01

## 2025-07-17 PROBLEM — R65.20 SEVERE SEPSIS (HCC): Status: ACTIVE | Noted: 2025-01-01

## 2025-07-17 PROBLEM — C19 RECTOSIGMOID CANCER (HCC): Status: ACTIVE | Noted: 2025-01-01

## 2025-07-17 NOTE — ED PROVIDER NOTES
Emergency Department Provider Note       [facility] EMERGENCY DEPT   PCP: Tracy Turner MD   Age: 59 y.o.   Sex: female     DISPOSITION Decision To Admit 07/17/2025 07:34:49 AM    ICD-10-CM    1. Healthcare-associated pneumonia  J18.9       2. Respiratory distress  R06.03       3. Acute respiratory failure with hypoxia (HCC)  J96.01           Medical Decision Making     Patient has a history of colon cancer with metastasis to the lung status post lung surgery had recent pneumonia that is now having a new oxygen requirement is tachycardic tachypneic with a leukocytosis right side of her chest x-ray shows diffuse opacifications.  I am giving vancomycin and Zosyn.  She is on 5 L she has been getting fluid bolus as well.  I discussed case with the hospitalist for admission she will likely need transfer downtown for subspecialty consultation.     1 or more acute illnesses that pose a threat to life or bodily function.   Shared medical decision making was utilized in creating the patients health plan today.  I independently ordered and reviewed each unique test.    I reviewed external records: provider visit note from outside specialist.  I reviewed external records: previous lab results from outside ED.  I reviewed external records: previous imaging study including radiologist interpretation.   The patients assessment required an independent historian: Son.  The reason they were needed is important historical information not provided by the patient.    I interpreted the X-rays right sided infiltrates near opacification of the right lung.  ED provider's independent EKG interpretation sinus tachycardia with artifact rate of 132 no STEMI  The patient was admitted and I have discussed patient management with the admitting provider.  The management of this patient was discussed with an external consultant.    SEP-1 CORE MEASURE    Fluid Resuscitation Rational: at least 30mL/kg based on entered actual weight at time of

## 2025-07-17 NOTE — PROGRESS NOTES
History and Physical Initial Visit NOTE           7/17/2025    Xi Hill                        Date of Admission:  7/17/2025    Attending Attestation- Pulmonary & Critical Care    In this split/shared evaluation I performed reviewed the patients's H&P, available images, labs, cultures., discussed case in detail with NPP, performed a medically appropriate history and exam, counseled and educated the patient and/or family member, ordered and/or reviewed medications, tests or procedures, documented information in EMR, independently interpreted images, and coordinated care. which accounted for 35 minutes clinical time.     Impression: Mrs. 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  She saw her oncologist a week ago, who reported worsening dyspnea and functional status and suggested hospice be engaged.  She was unwilling to pursue this or change code status.     Recommendations:  Assess with ultrasound to see if pleural effusion amenable to thoracentesis.    Will treat for possible pneumonia but strongly suspect worsening metastatic disease and end stage metastatic colon cancer.    Will confirm with oncology here that further treatments aren't safe right now.  Palliative care consultation appreciated.    Nhi Martinez MD  _____________________________________________________________________________________________      Subjective:     Patient is a 59 y.o.  female seen and evaluated at the request of Dr. Sanz for respiratory failure and pneumonia w/ near whiteout of right hemithorax. She has a PMH of colon cancer w/ mets to lung and primary lung cancer (sees [facility], recently stopped immunotherapy d/t side effects).     She was brought to [facility] by  for cavitary pneumonia at [facility] 7/5-7/8. On vanc and zosyn. Will switch vanc to zyvox for better lung coverage. Blood cultures pending. Send RVP, sputum and urine cultures. Narrow therapy as able.      Acute Respiratory Failure with Hypoxia  Plan: On 10L NC, does not wear oxygen at home- wean as tolerated. Encouraged incentive spirometry. Likely related to underlying advanced cancer.     Pleural Effusions   Plan: Bedside ultrasound performed, there was no clear path to safely perform thoracentesis.      Rectosigmoid cancer, metastatic to retroperitoneum and lungs     Primary lung cancer  Plan: Recently seen by [facility] on 7/15 and was taken off of immunotherapy d/t side effects. They also discussed palliative care options which were declined by the patient. Will consult palliative care for further conversations of goals of care. Will consult oncology.     Full Code    Thank you very much for this referral.  We appreciate the opportunity to participate in this patient's care.  Will follow along with above stated plan.    In this split/shared evaluation I performed performed a medically appropriate history and exam, counseled and educated the patient and/or family member, ordered medications, tests or procedures, documented information in EMR, and coordinated care. which accounted for 20 minutes clinical time.     Selena Braden Student Nurse Practitioner    Patient seen and examined with student NP. Agree with above assessment.  Sanjana Villalobos, MATILDE - CNP

## 2025-07-17 NOTE — ACP (ADVANCE CARE PLANNING)
Advance Care Planning     Advance Care Planning Activator (Inpatient)  Conversation Note      Date of ACP Conversation: 7/17/2025     ACP Activator: Regi Rodriguez RN    {When Decision Maker makes decisions on behalf of the incapacitated patient: Decision Maker is asked to consider and make decisions based on patient values, known preferences, or best interests.     Health Care Decision Maker: NO LW/HCPOA on file. Spouse is legal NOK unless document presented stating otherwise.     Current Designated Health Care Decision Maker:     Primary Decision Maker: Romulo Hill - Spouse - [phone]  Click here to complete Healthcare Decision Makers including section of the Healthcare Decision Maker Relationship (ie \"Primary\")  Today we documented Decision Maker(s) consistent with Legal Next of Kin hierarchy.    Care Preferences  Full code per MD orders.

## 2025-07-17 NOTE — PROGRESS NOTES
Patient arrived to room 3306 from  ER via transport.  Currently on oxygen at 10L HFNC.  Denies pain. See assessment flowsheet for complete assessment.

## 2025-07-17 NOTE — PROGRESS NOTES
SPIRITUAL HEALTH   Note for Initial Spiritual Assessment                   Room # FT11/11    Name: Xi Hill           Age: 59 y.o.    Gender: female          MRN: [id number]  Worship: Other       Preferred Language: English    Date: 07/17/25  Visit Time: Begin Time: 0920 End Time : 0940 Complexity of Encounter: Moderate      Visit Summary:  responded to referral and visited after IDT rounds. Patient was unable to communicate in the visit. Patient's family was at bedside. Family expressed difficulty coping with the patient's illness and wanted assurance of prayers.  provided calming presence, active listening, spiritual and/or emotional support, and assurance of prayers. Family was receptive to  support.  is available by referral for emergent needs.    Referral/Consult From: Nurse  Encounter Overview/Reason: Initial Encounter  Encounter Code:     Crisis (if applicable):    Service Provided For: Patient and family together     Patient was available.    Yumiko, Belief, Meaning:   Patient unable to assess at this time  Family/Friends identifies as spiritual  Rituals (if applicable)      Importance and Influence:  Patient unable to assess at this time  Family/Friends does not have spiritual/personal beliefs that influence decisions regarding the patient's health    Community:  Patient   unable to assess at this time   Family/Friends   indicated that they feel well-supported    Assessment and Plan of Care:   Emotions Expressed by Patient:   Assessment: Unable to assess, Other (Comment) (Patient was asleep)    Interventions by :   Intervention: Active listening, Nurtured Hope, Prayer (assurance of)/[place], Sustaining Presence/Ministry of presence     Result/ Response by Patient:   Outcome: Other (comment) (Patient was asleep)    Patient Plan of Care:         Emotions Expressed by Spouse/Family/Friends:   anxious  calm  fearful  hopeful  sad  powerlessness

## 2025-07-17 NOTE — PROCEDURES
PROCEDURE:  DIAGNOSTIC ULTRASOUND OF CHEST    DIAGNOSIS:  RIGHT PLEURAL EFFUSION    CHEST ULTRASOUND FINDINGS:    A Short Fuzecan ZoomSafer ultrasound was used to image the chest and localize the pleural effusion on the right Chest.    A small to moderate anechoic space was seen on the right side consistent with an uncomplicated pleural effusion.  There was no clear path to the pleural fluid due to peripheral lung tissue obstructing the window.      IMAGE: scanned to Media    Nhi Martinez MD

## 2025-07-17 NOTE — ED NOTES
Pt incontinent of urine. Pt cleaned and linens changed. Placed in gown. Pt still with increased WOB. Reports breathing somewhat improved. Dr. Sanz at bedside and aware. Purewick placed. Repeat lactic acid drawn and sent. Awaiting transfer to DT.

## 2025-07-17 NOTE — PROGRESS NOTES
VANCO DAILY NOTE  [facility]   Pharmacy Pharmacokinetic Monitoring Service - Vancomycin    Consulting Provider: Dr. Sanz   Indication: PNA, sepsis  Target Concentration: Goal AUC/SINGH 400-600 mg*hr/L  Day of Therapy: 1  Additional Antimicrobials: Zosyn    Pertinent Laboratory Values:   Wt Readings from Last 1 Encounters:   07/17/25 83.9 kg (185 lb)     Temp Readings from Last 1 Encounters:   07/17/25 98.9 °F (37.2 °C) (Oral)     Recent Labs     07/17/25  0638   BUN 21   CREATININE 0.80   WBC 22.5*   PROCAL 0.36*   LACTSEPSIS 2.2*     Estimated Creatinine Clearance: 83 mL/min (based on SCr of 0.8 mg/dL).    No results found for: \"VANCOTROUGH\", \"VANCORANDOM\"    MRSA Nasal Swab: Was ordered by provider, awaiting results    Assessment:  Date/Time Dose Concentration AUC         Note: Serum concentrations collected for AUC dosing may appear elevated if collected in close proximity to the dose administered, this is not necessarily an indication of toxicity    Plan:  Dosing recommendations based on Bayesian software  Start vancomycin 2000 mg x 1 dose, then 1500 mg q18h  Anticipated AUC of 512 and trough concentration of 10.5 at steady state  Renal labs as indicated   Vancomycin concentrations will be ordered as clinically appropriate  Pharmacy will continue to monitor patient and adjust therapy as indicated    Thank you for the consult,  Asia Bunch RPH

## 2025-07-17 NOTE — PROGRESS NOTES
TRANSFER - IN REPORT:    Verbal report received from COLEEN Murray on Xi Sergio  being received from  ER for routine progression of patient care      Report consisted of patient's Situation, Background, Assessment and   Recommendations(SBAR).     Information from the following report(s) Nurse Handoff Report, ED Encounter Summary, Adult Overview, Intake/Output, MAR, Recent Results, and Cardiac Rhythm ST was reviewed with the receiving nurse.    Opportunity for questions and clarification was provided.      Assessment completed upon patient's arrival to unit and care assumed.

## 2025-07-17 NOTE — ACP (ADVANCE CARE PLANNING)
Advance Care Planning Note   Admit Date:  2025  6:13 AM   Name:  Xi Hill   Age:  59 y.o.  Sex:  female  :  1965   MRN:  [id number]   Room:      Xi Hill has capacity to make her own decisions:   Yes    If pt unable to make decisions, POA/surrogate decision maker:  Spouse    Other people present:   sister    Patient / surrogate decision-maker directed code status:  Full Code    Other ACP topics discussed, if applicable:   None    Patient or surrogate consented to discussion of the current conditions, workup, management plans, prognosis, and the risk for further deterioration.  Time spent: 16 minutes in direct discussion.      Signed:  Raymon Sanz MD

## 2025-07-17 NOTE — ED NOTES
TRANSFER - OUT REPORT:    Verbal report given to Tracy Cardenas RN on Xi Hill  being transferred to [place] [id number] for routine progression of patient care       Report consisted of patient's Situation, Background, Assessment and   Recommendations(SBAR).     Information from the following report(s) Nurse Handoff Report, Index, ED Encounter Summary, ED SBAR, Adult Overview, Intake/Output, MAR, Recent Results, and Neuro Assessment was reviewed with the receiving nurse.    [place] Assessment:    Presents to emergency department  because of falls (Syncope, seizure, or loss of consciousness): No  Age > 70: No  Altered Mental Status, Intoxication with alcohol or substance confusion (Disorientation, impaired judgment, poor safety awaremess, or inability to follow instructions): No  Impaired Mobility: Ambulates or transfers with assistive devices or assistance; Unable to ambulate or transer.: Yes  Nursing Judgement: Yes          Lines:   Peripheral IV 07/17/25 Left Hand (Active)   Site Assessment Clean, dry & intact 07/17/25 0642   Line Status Blood return noted;Brisk blood return;Flushed 07/17/25 0642   Phlebitis Assessment No symptoms 07/17/25 0642   Infiltration Assessment 0 07/17/25 0642   Dressing Status New dressing applied 07/17/25 0642   Dressing Type Transparent 07/17/25 0642       Peripheral IV 07/17/25 Right Antecubital (Active)   Site Assessment Clean, dry & intact 07/17/25 0725   Line Status Blood return noted;Flushed;Specimen collected;Normal saline locked 07/17/25 0725   Phlebitis Assessment No symptoms 07/17/25 0725   Infiltration Assessment 0 07/17/25 0725   Dressing Status Clean, dry & intact;New dressing applied 07/17/25 0725        Opportunity for questions and clarification was provided.      Patient transported with:  Monitor and O2 @ 10lpm high flow NC. EMS

## 2025-07-17 NOTE — H&P
HOSPITALIST      Patient transferred from [facility] to [facility] for higher level care and specialist availability.  See my H&P from earlier today for further detail.      Signed:  Ronan Sanz M.D.   Hospitalist  07/17/25   7:04 PM

## 2025-07-17 NOTE — DISCHARGE SUMMARY
HOSPITALIST      Patient will be transferred from [facility] to [facility] for higher level care and specialist availability.  See my H&P from earlier today for further detail.      Signed:  Ronan Sanz M.D.   Hospitalist  07/17/25   7:05 PM

## 2025-07-17 NOTE — PROGRESS NOTES
4 Eyes Skin Assessment     NAME:  Xi Hill  YOB: 1965  MEDICAL RECORD NUMBER:  [id number]    The patient is being assessed for  Admission    I agree that at least one RN has performed a thorough Head to Toe Skin Assessment on the patient. ALL assessment sites listed below have been assessed.      Areas assessed by both nurses:    Head, Face, Ears, Shoulders, Back, Chest, Arms, Elbows, Hands, Sacrum. Buttock, Coccyx, Ischium, and Legs. Feet and Heels        Does the Patient have a Wound? No noted wound(s)       John Prevention initiated by RN: Yes  Wound Care Orders initiated by RN: No    For hospital-acquired stage 1 & 2 and ALL Stage 3,4, Unstageable, DTI, NWPT, and Complex wounds: place order “IP Wound Care/Ostomy Nurse Eval and Treat” by RN under : NA    New Ostomies, if present place, Ostomy referral order under : No     Nurse 1 eSignature: Electronically signed by DENISA ORELLANA RN on 7/17/25 at 7:11 PM EDT    **SHARE this note so that the co-signing nurse can place an eSignature**    Nurse 2 eSignature: {Esignature:[id number]}

## 2025-07-17 NOTE — H&P
Hospitalist History and Physical   Admit Date:  2025  6:13 AM   Name:  Xi Hlil   Age:  59 y.o.  Sex:  female  :  1965   MRN:  [id number]   Room:      Presenting/Chief Complaint: Fatigue     Reason(s) for Admission: Sepsis (HCC) [A41.9]     History of Present Illness:   Xi Hill is a 59 y.o. female with medical history most pertinent for colon cancer with lung metastases who presented to [facility] emergency department with complaint of shortness of breath, cough, fatigue, and generalized weakness.  She was doing well following recent discharge from [facility] (see below), but 3 days ago began to have overall decline of her condition with development and progression of those symptoms mentioned above.   stated he came home and found her in recliner semiconscious and in respiratory distress.  He checked her SpO2 and found it to be in the 70's, following which he transported her to the ER via private vehicle.  She has been on active immunotherapy but reportedly had to stop this a few weeks ago due to side effects.    Vital signs and presentation were notable for pulse 133, respiratory rate 34, /106.  Initial SPO2 was 78% on room air, after which she was quickly placed on 5 L O2 BNC with improvement in SPO2 to 94%.  CMP was notable for sodium 131, albumin 1.8.  Lactic acid was 2.2.  CBC pertinent for WBC 22.5 with left shift, hemoglobin 10.9 with normocytic indices.  ER provider ordered DuoNeb treatment, IV Zosyn, IV vancomycin, 1 L normal saline bolus x 2, and requested hospitalist admission for further evaluation and management.    Of note, she was hospitalized at [facility] from  through 2025 for cavitary pneumonia.  The body of the discharge summary is listed below.  Per the document new prescriptions upon discharge were that of cefpodoxime x 7 days, dexamethasone x 4 days, Norco, Levaquin 750 mg x 7 days, Linzess, naloxone spray, Zofran.    HPI: Xi sauceda

## 2025-07-17 NOTE — CARE COORDINATION
Attempted to see pt but staff at bedside doing procedure. Chart reviewed s/p admission sepsis. Pt lives with spouse. PCP and insurance. CM will continue to follow for ARMANI needs/POC when stable for d/c and attempt to see pt at later time.        07/17/25 9528   Service Assessment   Patient Orientation Alert and Oriented   Cognition Alert   History Provided By Medical Record   Primary Caregiver Spouse   Accompanied By/Relationship none/staff at bedside   Support Systems Spouse/Significant Other;Children;Family Members   Patient's Healthcare Decision Maker is: Legal Next of Kin   PCP Verified by CM Yes   Last Visit to PCP   (unknown)   Prior Functional Level Assistance with the following:   Current Functional Level Assistance with the following:   Can patient return to prior living arrangement Unknown at present   Ability to make needs known: Fair   Family able to assist with home care needs: Yes   Would you like for me to discuss the discharge plan with any other family members/significant others, and if so, who? Yes   Financial Resources Medicare;Other (Comment)  ([employer] /MCR)   Community Resources Other (Comment)  (unknown at present)   CM/SW Referral Other (see comment)  (Pending)   Social/Functional History   Lives With Spouse   Discharge Planning   Type of Residence Other (Comment)  (pending)   Living Arrangements Spouse/Significant Other   Condition of Participation: Discharge Planning   Freedom of Choice list was provided with basic dialogue that supports the patient's individualized plan of care/goals, treatment preferences, and shares the quality data associated with the providers?  Yes

## 2025-07-18 PROBLEM — R54 FRAILTY: Status: ACTIVE | Noted: 2025-07-18

## 2025-07-18 PROBLEM — Z51.5 ENCOUNTER FOR PALLIATIVE CARE: Status: ACTIVE | Noted: 2025-07-18

## 2025-07-18 PROBLEM — R06.02 SHORTNESS OF BREATH: Status: ACTIVE | Noted: 2025-01-01

## 2025-07-18 PROBLEM — F41.9 ANXIETY: Status: ACTIVE | Noted: 2025-01-01

## 2025-07-18 NOTE — PROGRESS NOTES
Nutrition Monitoring/Evaluation    Reason for contact: Diet advancmenet    Current Nutrition Therapies:  ADULT DIET; Full Liquid    Diet advanced to full liquid per SLP. RD will add Ensure Plus with meals to supplement intake.     Meron Gomez RD

## 2025-07-18 NOTE — PROGRESS NOTES
Spiritual Health Critical Care Progress Note  [facility]      Room # 3306/01    Name: Xi Hill           Age: 60 y.o.    Gender: female          MRN: [id number]  Samaritan: Other       Preferred Language: English      Date: 07/18/25  Visit Time: Begin Time: 1500 End Time : 1510  Complexity of Encounter: Moderate      Visit Summary:  visited patient and family (, son and niece) in CCU after attending IDR to assess spiritual health needs. Other visitors had also been coming and going and supportive, including support from their Holiness.  Patient wore a positive pressure mask and was not able to talk, but was alert. Family was withdrawn at time and expressed grief concerning medical issues.   provided pastoral presence, prayer and empathetic listening.  will continue to follow patient in ICU and assess further support.      Referral/Consult From: Rounding  Encounter Overview/Reason: Follow-up  Service Provided For: Patient and family together     Patient was available.    Yumiko, Belief, Meaning:   Patient is connected with a yumiko tradition or spiritual practice  has beliefs or practices that help with coping during difficult times  Family/Friends are connected with a yumiko tradition or spiritual practice  have beliefs or practices that help with coping during difficult times    Importance and Influence:  Patient does not have spiritual/personal beliefs that influence decisions regarding their health  Family/Friends does not have spiritual/personal beliefs that influence decisions regarding the patient's health    Community:  Patient   is connected with a spiritual community  indicated that they feel well-supported  Support System Includes   Family members, Spouse, Children, Anabaptist/yumiko community   Family/Friends   is connected with a spiritual community  indicated that they feel well-supported  Support System Includes   Family members, Spouse, Children,  Anabaptism/brianne community     Assessment and Plan of Care:   Emotions Expressed by Patient:   Assessment: Complicated grieving, Powerlessness, Shock    Interventions by :   Intervention: Active listening, Sustaining Presence/Ministry of presence, Prayer (assurance of)/[place], Explored/Affirmed feelings, thoughts, concerns     Result/ Response by Patient:   Outcome: Receptive, Engaged in conversation    Patient Plan of Care:   Plan and Referrals  Plan/Referrals: Continue to visit, (comment)     Emotions Expressed by Spouse/Family/Friends:   calm  grieving  powerlessness     Interventions with Spouse/ Family/Friends include:   active listening  prayer  provided ministry of presence  facilitated life review and/or legacy    Spouse/Family/Friends Plan of Care:   Spiritual care available upon referral.      Electronically signed by    Marita castillo with youChaplain Burak on 7/18/2025 at 3:31 PM.   Spiritual Health   [facility]  [phone]

## 2025-07-18 NOTE — PROGRESS NOTES
07/18/25 1918   NIV Type   NIV Started/Stopped On   Equipment Type v60   Mode Bilevel   Mask Type Under the nose   Mask Size Medium   Bonnet size Medium   Assessment   Pulse (!) 135   Respirations 23   SpO2 97 %   Comfort Level Good   Using Accessory Muscles No   Mask Compliance Good   Skin Assessment Clean, dry, & intact   Breath Sounds   Respiratory Pattern Tachypneic   Breath Sounds Bilateral Diminished   Settings/Measurements   PIP Observed 19 cm H20   IPAP 18 cmH20   CPAP/EPAP 8 cmH2O   Vt (Measured) 479 mL   Rate Ordered 18   Insp Rise Time (%) 3 %   FiO2  60 %   Minute Volume (L/min) 18.8 Liters   Mask Leak (lpm) 0 lpm   Patient's Home Machine No   Alarm Settings   Alarms On Y   Low Pressure (cmH2O) 5 cmH2O   High Pressure (cmH2O) 30 cmH2O   Apnea (secs) 20 secs   RR Low (bpm) 10   RR High (bpm) 60 br/min

## 2025-07-18 NOTE — PROGRESS NOTES
GOALS:  LTG: Patient will tolerate least restrictive diet without overt signs or symptoms of airway compromise.   STG: Patient will tolerate thin liquids and puree textured foods without overt signs or symptoms of airway compromise.   STG: Patient will participate in modified barium swallow study as clinically indicated.      SPEECH LANGUAGE PATHOLOGY: DYSPHAGIA Initial Assessment    Acknowledge Order  I  Therapy Time  I   Charges     I  Rehab Caseload Tracker      NAME: Xi Hill  : 1965  MRN: [id number]    ADMISSION DATE: 2025  PRIMARY DIAGNOSIS: Sepsis (HCC)    ICD-10: Treatment Diagnosis: R13.12 Dysphagia, Oropharyngeal Phase    RECOMMENDATIONS   Diet:    Full Liquid Diet - OK for puree textured foods and thin liquids.     Medication: As tolerated    Compensatory Swallowing Strategies:   Small bites/sips  Upright as possible for all oral intake  Frequent breaks due to respiratory status   Therapeutic Intervention:   Patient/family education  Dysphagia treatment   Patient continues to require skilled intervention:  Yes. Recommend ongoing speech therapy services during this hospitalization.     Anticipated Discharge Needs: Do not anticipate ongoing speech therapy needs upon discharge.      ASSESSMENT    Dysphagia: Patient without overt signs of aspiration with thin liquids and purees. While no overt signs of aspiration were noted, patient at increased risk for aspiration due to respiratory status. Recommend full liquid diet - OK for thin liquids and purees. Discussed with staff and family that patient's tolerance for PO intake may fluctuate due to patient's respiratory status.     Speech therapy will follow for dysphagia during acute phase of care.     GENERAL    Subjective: Patient awake, alert when engaged on this date.     Reason for Consult:  cough after eating and drinking thins     History of Present Injury/Illness: Ms. Hill  has a past medical history of Abscess and Hypertension.. She also

## 2025-07-18 NOTE — CARE COORDINATION
Chart reviewed and pt discussed in am IDR. Continues CCU s/p admission with worsen dyspnea/functional status. Hx or advanced rectosigmoid adenocarcinoma with metastases.     Palliative, Intensivist, and Hospitalist following.     No cont gtts. 10L NC O2. Pt wanting full code status at present although not candidate for cancer tx and poor prognosis per MD notes.     CM following for any assist and ARMANI needs.

## 2025-07-18 NOTE — PROGRESS NOTES
(!) 122 15 (!) 134/100 94 %   07/18/25 0015 -- (!) 122 19 (!) 171/81 93 %   07/18/25 0000 99.1 °F (37.3 °C) (!) 122 16 (!) 147/91 94 %   07/17/25 2345 -- (!) 122 16 (!) 151/83 94 %   07/17/25 2330 -- (!) 121 24 (!) 150/90 94 %   07/17/25 2315 -- (!) 123 17 (!) 148/89 93 %   07/17/25 2300 -- (!) 122 16 (!) 143/97 94 %   07/17/25 2245 -- (!) 122 17 (!) 144/104 93 %   07/17/25 2230 -- (!) 122 16 (!) 144/107 93 %   07/17/25 2215 -- (!) 122 19 122/89 93 %   07/17/25 2200 -- (!) 122 15 (!) 143/76 93 %   07/17/25 2145 -- (!) 122 15 (!) 149/89 93 %   07/17/25 2130 -- (!) 122 18 (!) 140/85 92 %   07/17/25 2115 -- (!) 123 17 137/88 92 %   07/17/25 2100 -- (!) 123 21 (!) 143/81 91 %   07/17/25 2045 -- (!) 123 16 (!) 155/84 94 %   07/17/25 2030 -- (!) 124 16 (!) 150/88 92 %   07/17/25 2015 -- (!) 124 19 (!) 145/81 93 %   07/17/25 2000 -- (!) 123 19 (!) 151/89 92 %   07/17/25 1945 -- (!) 123 (!) 32 (!) 164/95 92 %   07/17/25 1930 -- (!) 122 22 (!) 146/89 91 %   07/17/25 1915 99 °F (37.2 °C) (!) 124 (!) 32 (!) 146/93 92 %   07/17/25 1900 -- (!) 125 (!) 33 (!) 151/96 92 %   07/17/25 1845 -- (!) 126 (!) 40 (!) 156/105 92 %   07/17/25 1830 -- (!) 127 (!) 41 (!) 170/107 92 %   07/17/25 1819 -- -- -- -- 94 %   07/17/25 1815 -- (!) 123 (!) 48 (!) 178/109 94 %   07/17/25 1800 -- (!) 125 21 (!) 144/85 92 %   07/17/25 1745 -- (!) 124 21 (!) 146/86 93 %   07/17/25 1730 99.1 °F (37.3 °C) (!) 124 (!) 40 (!) 159/95 94 %   07/17/25 1715 -- (!) 125 (!) 33 (!) 157/92 94 %   07/17/25 1700 -- (!) 123 (!) 36 (!) 168/101 95 %   07/17/25 1645 -- (!) 124 (!) 33 (!) 149/93 96 %   07/17/25 1630 -- (!) 123 22 (!) 145/88 96 %   07/17/25 1615 -- (!) 123 29 (!) 146/88 95 %   07/17/25 1600 -- (!) 123 24 (!) 145/86 95 %   07/17/25 1545 -- (!) 123 (!) 55 (!) 144/94 94 %   07/17/25 1530 -- (!) 124 (!) 31 (!) 140/93 94 %   07/17/25 1515 -- (!) 123 (!) 33 (!) 153/98 95 %   07/17/25 1500 -- (!) 124 (!) 40 (!) 153/99 94 %   07/17/25 1445 -- (!) 127 30 132/84 96 %  degrees    R Axis 116 degrees    T Axis 149 degrees    Diagnosis       Right and left arm electrode reversal, interpretation assumes no reversal  Sinus tachycardia  Baseline wander  Non-specific ST-t wave changes  Low voltage QRS  Confirmed by LUBA ELENA ()HENNY ([id number]) on 7/17/2025 7:30:13 AM     Culture, Blood 1    Collection Time: 07/17/25  6:38 AM    Specimen: Blood   Result Value Ref Range    Special Requests LEFT  Antecubital        Culture NO GROWTH AFTER 23 HOURS     Culture, Blood 2    Collection Time: 07/17/25  6:38 AM    Specimen: Blood   Result Value Ref Range    Special Requests LEFT  HAND        Culture NO GROWTH AFTER 23 HOURS     Lactate, Sepsis    Collection Time: 07/17/25  6:38 AM   Result Value Ref Range    Lactic Acid, Sepsis 2.2 (H) 0.5 - 2.0 MMOL/L   CBC with Auto Differential    Collection Time: 07/17/25  6:38 AM   Result Value Ref Range    WBC 22.5 (H) 4.3 - 11.1 K/uL    RBC 4.25 4.05 - 5.2 M/uL    Hemoglobin 10.9 (L) 11.7 - 15.4 g/dL    Hematocrit 35.1 (L) 35.8 - 46.3 %    MCV 82.6 82.0 - 102.0 FL    MCH 25.6 (L) 26.1 - 32.9 PG    MCHC 31.1 (L) 31.4 - 35.0 g/dL    RDW 18.6 (H) 11.9 - 14.6 %    Platelets 318 150 - 450 K/uL    MPV 8.8 (L) 9.4 - 12.3 FL    nRBC 0.00 0.0 - 0.2 K/uL    Differential Type AUTOMATED      Neutrophils % 86.0 (H) 43.0 - 78.0 %    Lymphocytes % 2.9 (L) 13.0 - 44.0 %    Monocytes % 8.1 4.0 - 12.0 %    Eosinophils % 0.5 0.5 - 7.8 %    Basophils % 0.3 0.0 - 2.0 %    Immature Granulocytes % 2.2 0.0 - 5.0 %    Neutrophils Absolute 19.31 (H) 1.70 - 8.20 K/UL    Lymphocytes Absolute 0.66 0.50 - 4.60 K/UL    Monocytes Absolute 1.83 (H) 0.10 - 1.30 K/UL    Eosinophils Absolute 0.11 0.00 - 0.80 K/UL    Basophils Absolute 0.07 0.00 - 0.20 K/UL    Immature Granulocytes Absolute 0.50 0.0 - 0.5 K/UL   Comprehensive Metabolic Panel    Collection Time: 07/17/25  6:38 AM   Result Value Ref Range    Sodium 131 (L) 136 - 145 mmol/L    Potassium 4.3 3.5 - 5.1 mmol/L    Chloride 92  (L) 98 - 107 mmol/L    CO2 24 20 - 29 mmol/L    Anion Gap 15 7 - 16 mmol/L    Glucose 154 (H) 70 - 99 mg/dL    BUN 21 6 - 23 MG/DL    Creatinine 0.80 0.60 - 1.10 MG/DL    Est, Glom Filt Rate 85 >60 ml/min/1.73m2    Calcium 9.2 8.8 - 10.2 MG/DL    Total Bilirubin 0.4 0.0 - 1.2 MG/DL    ALT 38 8 - 45 U/L    AST 39 (H) 15 - 37 U/L    Alk Phosphatase 110 (H) 35 - 104 U/L    Total Protein 7.1 6.3 - 8.2 g/dL    Albumin 1.8 (L) 3.5 - 5.0 g/dL    Globulin 5.3 (H) 2.3 - 3.5 g/dL    Albumin/Globulin Ratio 0.3 (L) 1.0 - 1.9     COVID-19, Rapid    Collection Time: 07/17/25  6:38 AM    Specimen: Nasopharyngeal   Result Value Ref Range    Source NASAL SWAB      SARS-CoV-2, Rapid Not detected NOTD     Procalcitonin    Collection Time: 07/17/25  6:38 AM   Result Value Ref Range    Procalcitonin 0.36 (H) 0.00 - 0.10 ng/mL   Lactate, Sepsis    Collection Time: 07/17/25  8:38 AM   Result Value Ref Range    Lactic Acid, Sepsis 2.3 (H) 0.5 - 2.0 MMOL/L   Urinalysis    Collection Time: 07/17/25 10:27 AM   Result Value Ref Range    Color, UA YELLOW/STRAW      Appearance CLEAR      Specific Gravity, UA 1.030 (H) 1.001 - 1.023      pH, Urine 7.0 5.0 - 9.0      Protein, UA 30 (A) NEG mg/dL    Glucose, Ur Negative NEG mg/dL    Ketones, Urine Negative NEG mg/dL    Bilirubin, Urine Negative NEG      Blood, Urine MODERATE (A) NEG      Urobilinogen, Urine 0.2 0.2 - 1.0 EU/dL    Nitrite, Urine Negative NEG      Leukocyte Esterase, Urine TRACE (A) NEG      WBC, UA 0-4 U4 /hpf    RBC, UA 10-20 (A) U5 /hpf    Epithelial Cells, UA 0-5 U5 /hpf    BACTERIA, URINE Negative NEG /hpf    Hyaline Casts, UA 0-2 /lpf   Lactate, Sepsis    Collection Time: 07/17/25  2:07 PM   Result Value Ref Range    Lactic Acid, Sepsis 1.7 0.5 - 2.0 MMOL/L   Respiratory Panel, Molecular, with COVID-19 (Restricted: peds pts or suitable admitted adults)    Collection Time: 07/17/25  5:09 PM    Specimen: Nasopharyngeal   Result Value Ref Range    Adenovirus by PCR NOT DETECTED

## 2025-07-18 NOTE — PLAN OF CARE
Problem: Respiratory - Adult  Goal: Achieves optimal ventilation and oxygenation  7/18/2025 1941 by Yael Robertson RN  Outcome: Not Progressing  7/18/2025 0913 by Madelaine Lozano RN  Outcome: Not Progressing     Problem: Musculoskeletal - Adult  Goal: Return ADL status to a safe level of function  7/18/2025 1941 by Yael Robertson RN  Outcome: Not Progressing  7/18/2025 0913 by Madelaine Lozano, RN  Outcome: Progressing     Problem: Gastrointestinal - Adult  Goal: Maintains adequate nutritional intake  7/18/2025 1941 by Yael Robertson RN  Outcome: Not Progressing  7/18/2025 0913 by Madelaine Lozano, RN  Outcome: Progressing

## 2025-07-18 NOTE — PROGRESS NOTES
Nutrition Assessment  Assessment Type: Initial, Positive nutrition screen  Reason for visit:  Best Practice Alert: Malnutrition Screening Tool   Malnutrition Screening Tool Score: 2  Unintentional weight loss PTA: 2 to 13 pounds (1 point)  Eating poorly due to decreased appetite: Yes (1 point)    Nutrition Intervention:   Food and/or Nutrient Delivery:   Meals and Snacks:  Diet: Continue current diet and advance as medically appropriate per SLP  Medical Food Supplements:   Medical food supplement therapy:  Initiate Ensure Clear (clear liquid oral supplement) 240 calories, 8 grams protein per 8 ounce serving  Only provide PO intake when awake/alert.     NPO/Clear liquid diet status day number 1 is related to impaired respiratory status. If anticipated patient will remain NPO/Clear liquid for greater than 2-3 days, consider nutrition support for primary needs. If nutrition support pursued, order appropriate route and an Inpatient Consult to Dietitian for RD to manage.     Malnutrition Assessment:  Malnutrition Status: Insufficient data (unable to complete NFPE. Unable to obtain nutrition hx.)    Nutrition Focused Physical Exam: Deferred at this time    Nutrition Assessment:  Food/Nutrition Related History: Unable to obtain at this time.      Do You Have Any Cultural, Worship, or Ethnic Food Preferences?: No   Weight History: No weight hx available.   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 Recently discharged form [facility] for sepsis due to cavitary pneumonia. She was found semiconscious in respiratory distress by . Admitted with pneumonia and severe sepsis to [facility]. Quickly transferred to [facility]. Additional findings of R pleural effusion   Nutrition Monitoring/Evaluation:  COLEEN Burkett asked RD defer visit today due to multiple family members at bedside visiting with

## 2025-07-18 NOTE — PROGRESS NOTES
History and Physical Initial Visit NOTE           7/18/2025    Formerly [facility]                        Date of Admission:  7/17/2025        Subjective:     Patient is a 60 y.o.  female seen and evaluated at the request of Dr. Sanz for respiratory failure and pneumonia w/ near whiteout of right hemithorax. She has a PMH of colon cancer w/ mets to lung and primary lung cancer (sees [facility] oncology, recently stopped immunotherapy d/t side effects).     She was brought to [facility] by her  w/ SOB, non productive cough, and fatigue. He states he came home to find her semi conscious and in respiratory failure w/ sats in the 70s. She was recently admitted at [facility] from 7/5-7/8 w/ a cavitary pneumonia and was discharged on cefpodoxime, levaquin, and decadron. Upon arrival she was tachycardic, tachypneic, w/ SpO2 78% on RA. She was placed on 5L w/ SpO2 improvement to 94%. Notable labs: WBC 22.5 w/ L shift, Hgb 10.9, procal 0.36, Lactic 2.2, Na 131, albumin 1.8. CT chest was negative for PE but showed innumerable masses and consolidation w/ interval cavitation. Sepsis criteria was met and bundle was completed in the ED, blood cultures are pending, covid swab was negative. She was started on vanc and zosyn and and she was transferred downtown for high risk of decompensation.     Review of Systems: Comprehensive ROS negative except in HPI    Current Outpatient Medications   Medication Instructions    albuterol sulfate HFA (PROVENTIL;VENTOLIN;PROAIR) 108 (90 Base) MCG/ACT inhaler 2 puffs, EVERY 6 HOURS PRN    albuterol sulfate HFA (VENTOLIN HFA) 108 (90 Base) MCG/ACT inhaler 2 puffs, Inhalation, 4 TIMES DAILY PRN    amLODIPine (NORVASC) 5 mg, Oral, DAILY    brompheniramine-pseudoephedrine-DM 2-30-10 MG/5ML syrup 7.5 mLs, EVERY 6 HOURS PRN    dexAMETHasone (DECADRON) 4 mg, 2 TIMES DAILY    diclofenac sodium (VOLTAREN) 1-2 g, 3 TIMES DAILY    doxycycline monohydrate (MONODOX) 100 mg, 2

## 2025-07-18 NOTE — CONSULTS
[facility]        Inpatient Hematology / Oncology Consult    Reason for Consult:  Sepsis (HCC) [A41.9]  Referring Physician:  Austen Adame MD    History of Present Illness:  Ms. Hill is a 60 y.o. female admitted on 7/17/2025.    Her PMH includes HTN and ESVIN.  She is a patient of Dr Lowery at [facility] with metastatic lung adenocarcinoma (liver) s/p lobectomy; s/p Cis/Alimta 2022 - 2023; and most recently on osimertinib since 1/2025 which was discontinued by Dr Lowery on 7/10 as 'risk outweighs benefit'.  She also has a history of metastatic colon cancer (lung, nodes) s/p multiple lines of treatment including FOLFOX/Avastin, Capecitabine/Avastin, Irinotecan/Avastin, Lonsurf, and most recently on regorafenib since 6/2025.  She is s/p XRT to R hilar mass from 6/9 - 6/13.  She recently saw Dr Lowery on 7/15, osimertinib was discontinued.  Dr Lowery discussed that patient's functionality continues to decline from her cancer and her issues with dyspnea are r/t her heavy burden of metastatic disease in the lungs.  He believes it is low likelihood she has a significant response to regorafenib and strongly recommends Hospice and DNR.  Pt declined DNR and Hospice, expressing hope that systemic anticancer therapy will help her condition.    She presented to ED with c/o shortness of breath, cough, fatigue, and generalized weakness.  She was recently discharged from [facility] after being admitted from 7/5 - 7/8 with sepsis 2/2 cavitary pneumonia.  She was treated with Unasyn and steroids and discharged home on Vantin and Levaquin.  Prior to admission,  found pt in recliner semiconscious and in respiratory distress with O2 in 70s.  ED w/u with , RR 34, /106, 78% on RA requiring O2 @ 5L. Na+ 131, Lactic 2.2.  WBC 22k, Hgb 10.9.  CXR with significant interval progression of opacities throughout lungs and near complete opacification R hemithorax.  CTA chest with significant interval    TECHNIQUE: AP view of the chest was obtained.     IMPRESSION:  Findings/impression: Significant interval progression of the previously  identified opacities now demonstrated throughout the lungs with near complete  opacification of the right hemithorax. Right chest wall Mediport in place. The  cardiomediastinal silhouette is not well evaluated on this examination.  Dedicated CT chest with contrast should be considered for further evaluation       ASSESSMENT:  Principal Problem:    Sepsis (HCC)  Active Problems:    Acute respiratory failure with hypoxia (HCC)    Rectosigmoid cancer with metastases (HCC)    Hypoalbuminemia    Pleural effusion on right    Colon cancer metastasized to lung (HCC)    Shortness of breath    Anxiety    Frailty    Encounter for palliative care  Resolved Problems:    * No resolved hospital problems. *    Ms. Hill is a 60 y.o. female admitted on 7/17/2025.    Her PMH includes HTN and ESVIN.  She is a patient of Dr Lowery at [facility] with metastatic lung adenocarcinoma (liver) s/p lobectomy; s/p Cis/Alimta 2022 - 2023; and most recently on osimertinib since 1/2025 which was discontinued by Dr Lowery on 7/10 as 'risk outweighs benefit'.  She also has a history of metastatic colon cancer (lung, nodes) s/p multiple lines of treatment including FOLFOX/Avastin, Capecitabine/Avastin, Irinotecan/Avastin, Lonsurf, and most recently on regorafenib since 6/2025.  She is s/p XRT to R hilar mass from 6/9 - 6/13.  She recently saw Dr Lowery on 7/15, osimertinib was discontinued.  Dr Lowery discussed that patient's functionality continues to decline from her cancer and her issues with dyspnea are r/t her heavy burden of metastatic disease in the lungs.  He believes it is low likelihood she has a significant response to regorafenib and strongly recommends Hospice and DNR.  Pt declined DNR and Hospice, expressing hope that systemic anticancer therapy will help her condition.    She presented to ED with c/o  likelihood she has a significant response to regorafenib and strongly recommends Hospice and DNR.  Pt declined DNR and Hospice, expressing hope that systemic anticancer therapy will help her condition.  - CTA chest with POD  - Dr Gonzales discussed pt's POD through multiple treatment lines and that he is in agreement with pt's primary oncologist, Dr Lowery's recommendation of DNR status and Hospice.  Multiple family members at bedside.  Pt on BiPAP and appears lethargic and was not actively engaged in discussion.  However, family insists on full code and continuing all active measures at this time.  - Hospice would be appropriate, nothing to offer from oncology at this time.  Pt is not a candidate for cancer related treatment.    Severe sepsis / R pneumonia  - met severe sepsis criteria on admission with leukocytosis, hypoxia, tachycardia, and tachypnea  - Bcx-[id number]  - Sputum cx and Ucx pending  - RVP neg  - On Zosyn/Linezolid    Acute hypoxic resp failure  - likely r/t worsening metastatic disease  - Pulm following - unable to perform thoracentesis - no clear path to pleural fluid d/t peripheral lung tissue obstructing window    Thank you for allowing us to participate in the care of Ms. Hill. We will sign off; please call with questions.  Ms. Hill will need to f/u with her oncologist, Dr Lowery at [facility] upon discharge if she does not elect to pursue Hospice.         MATILDE Lora - [facility]  [address] [place] [address]  Office : [phone]  Fax : [phone]   I personally saw, exammed and counselled the patient, and discussed with NP, agree with above history/assessment/plan. Exam: No acute distress, normal breathing effort and breath sounds, regular heart rate and heart sound, benign abd, normal ROM of limbs. 60 y.o.female with complicated cancer history involving primary lung cancer with liver metastasis receiving osimertinib but discontinued due to

## 2025-07-18 NOTE — PROGRESS NOTES
SPEECH LANGUAGE PATHOLOGY: ATTEMPT     NAME: Xi Hill  : 1965  MRN: [id number]    ADMISSION DATE: 2025  PRIMARY DIAGNOSIS: Sepsis (HCC)    Speech therapy consulted to eval/treat. When engaged, patient resting and speech therapy services deferred evaluation for patient to rest. Speech therapy will follow up and treat, as patient is available.      JETT MARIEE  2025 11:51 AM

## 2025-07-18 NOTE — CONSULTS
Patient: Xi Hill MRN: [id number]  SSN: [id number]    YOB: 1965  Age: 60 y.o.  Sex: female       Date of Request: 7/18/2025  Date of Consult:  7/18/2025  Reason for Consult:  family support and education, goals of care, and medical decision making  Requesting Physician: Dr. Sanz     Assessment/Plan:     Principal Diagnosis:    Dyspnea  R06.00    Additional Diagnoses:   Debility, Unspecified  R53.81  Frailty  R54  Counseling, Encounter for Medical Advice  Z71.9  Encounter for Palliative Care  Z51.5    Palliative Performance Scale (PPS):       Medical Decision Making:   Reviewed and summarized labs and imaging from admission.  Met with pt and  at bedside.  Pt appears anxious and dyspneic.  Explained extensive cancer findings that leave little lung function.   states pt did not tolerate recent attempts at chemotherapy.  I discussed code status and explained we are unable to fix underlying disease and potential for pt to be stuck on machines.   expressed understanding and wishes to talk more with pt.   Will follow.     I spent greater than 35 minutes on advanced care planning.      Will discuss findings with members of the interdisciplinary team.      Thank you for this referral.         Subjective:     History obtained from:  Patient, Family, and Chart    Chief Complaint: dyspnea  History of Present Illness:  59 y.o. female with medical history most pertinent for colon cancer with lung metastases who presented to [facility] emergency department with complaint of shortness of breath, cough, fatigue, and generalized weakness.  She was doing well following recent discharge from [facility] (see below), but 3 days ago began to have overall decline of her condition with development and progression of those symptoms mentioned above.   stated he came home and found her in recliner semiconscious and in respiratory distress.  He checked her SpO2 and found it to be  sounds   Heart:  Regular rate and rhythm, no murmur    Abdomen:   Soft, non-tender, non-distended. Positive bowel sounds   Extremities: Normal, atraumatic, no cyanosis or edema   Skin: Skin color, texture, turgor normal. No rash or lesions.   Neurologic: Nonfocal   Psych: Alert and oriented       Signed By: Keegan Esteban MD     July 18, 2025

## 2025-07-18 NOTE — PLAN OF CARE
Problem: Safety - Adult  Goal: Free from fall injury  Outcome: Progressing     Problem: Anxiety  Goal: Will report anxiety at manageable levels  Description: INTERVENTIONS:  1. Administer medication as ordered  2. Teach and rehearse alternative coping skills  3. Provide emotional support with 1:1 interaction with staff  Outcome: Progressing     Problem: Coping  Goal: Pt/Family able to verbalize concerns and demonstrate effective coping strategies  Description: INTERVENTIONS:  1. Assist patient/family to identify coping skills, available support systems and cultural and spiritual values  2. Provide emotional support, including active listening and acknowledgement of concerns of patient and caregivers  3. Reduce environmental stimuli, as able  4. Instruct patient/family in relaxation techniques, as appropriate  5. Assess for spiritual pain/suffering and initiate Spiritual Care, Psychosocial Clinical Specialist consults as needed  Outcome: Progressing

## 2025-07-19 PROBLEM — C34.90 PRIMARY MALIGNANT NEOPLASM OF LUNG METASTATIC TO OTHER SITE (HCC): Status: ACTIVE | Noted: 2025-07-19

## 2025-07-19 PROBLEM — C78.7 METASTASIS TO LIVER (HCC): Status: ACTIVE | Noted: 2025-01-01

## 2025-07-19 NOTE — PROGRESS NOTES
(!) 135 11 128/84 94 %   07/18/25 1315 -- (!) 135 13 113/83 93 %   07/18/25 1245 -- (!) 137 22 (!) 111/91 90 %   07/18/25 1236 -- (!) 134 22 -- 94 %   07/18/25 1231 -- (!) 133 13 -- 91 %   07/18/25 1230 -- (!) 133 12 -- 91 %   07/18/25 1200 -- (!) 134 13 (!) 119/91 91 %   07/18/25 1153 -- (!) 133 14 -- 91 %   07/18/25 1109 -- (!) 131 16 -- 94 %   07/18/25 1100 99.5 °F (37.5 °C) (!) 130 19 (!) 160/97 94 %   07/18/25 1058 -- (!) 132 18 -- 93 %   07/18/25 1055 -- (!) 130 28 -- 90 %   07/18/25 1045 -- (!) 130 23 -- 90 %   07/18/25 1030 -- (!) 129 21 -- 92 %   07/18/25 1000 -- (!) 128 24 (!) 172/93 92 %   07/18/25 0940 -- (!) 128 27 (!) 168/109 92 %   07/18/25 0912 -- (!) 126 25 -- 92 %   07/18/25 0911 -- (!) 127 30 -- 93 %   07/18/25 0900 -- (!) 127 (!) 37 (!) 184/112 91 %       Oxygen Therapy  SpO2: (!) 88 %  Pulse Oximeter Device Mode: Continuous  Pulse via Oximetry: 126 beats per minute  Pulse Oximeter Device Location: Finger  Oximetry Probe Site Changed: No  O2 Device: PAP (positive airway pressure)  O2 Flow Rate (L/min): 10 L/min  FiO2 : 60 %  Skin Assessment: Clean, dry, & intact  Skin Protection for O2 Device: N/A  Blood Gas  Performed?: Yes  David's Test #1: Collateral flow confirmed  Site #1: Left Radial  Site Prepped #1: Yes  Number of Attempts #1: 1  Pressure Held #1: Yes  Complications #1: None  Post-procedure #1: Standard  Specimen Status #1: Point of care  How Tolerated?: Tolerated well    Estimated body mass index is 29.37 kg/m² as calculated from the following:    Height as of this encounter: 1.676 m (5' 5.98\").    Weight as of this encounter: 82.5 kg (181 lb 14.1 oz).    Intake/Output Summary (Last 24 hours) at 7/19/2025 0856  Last data filed at 7/19/2025 0732  Gross per 24 hour   Intake 1028.89 ml   Output 1150 ml   Net -121.11 ml         Physical Exam:     General:    Elderly, alert and awake, appears comfortable on NIV  Head:  Normocephalic, atraumatic  Eyes:  Sclerae appear normal.  Pupils equally  Time: 07/19/25  3:19 AM   Result Value Ref Range    WBC 23.4 (H) 4.3 - 11.1 K/uL    RBC 3.92 (L) 4.05 - 5.2 M/uL    Hemoglobin 10.5 (L) 11.7 - 15.4 g/dL    Hematocrit 36.1 35.8 - 46.3 %    MCV 92.1 82 - 102 FL    MCH 26.8 26.1 - 32.9 PG    MCHC 29.1 (L) 31.4 - 35.0 g/dL    RDW 18.5 (H) 11.9 - 14.6 %    Platelets 222 150 - 450 K/uL    MPV 9.4 9.4 - 12.3 FL    nRBC 0.02 0.0 - 0.2 K/uL    Differential Type AUTOMATED      Neutrophils % 84.5 (H) 43.0 - 78.0 %    Lymphocytes % 2.5 (L) 13.0 - 44.0 %    Monocytes % 10.0 4.0 - 12.0 %    Eosinophils % 0.6 0.5 - 7.8 %    Basophils % 0.3 0.0 - 2.0 %    Immature Granulocytes % 2.1 0.0 - 5.0 %    Neutrophils Absolute 19.74 (H) 1.70 - 8.20 K/UL    Lymphocytes Absolute 0.59 0.50 - 4.60 K/UL    Monocytes Absolute 2.35 (H) 0.10 - 1.30 K/UL    Eosinophils Absolute 0.13 0.00 - 0.80 K/UL    Basophils Absolute 0.08 0.00 - 0.20 K/UL    Immature Granulocytes Absolute 0.50 0.0 - 0.5 K/UL       Recent Labs     07/17/25  1709   COVID19 NOT DETECTED       Current Meds:  Current Facility-Administered Medications   Medication Dose Route Frequency    gabapentin (NEURONTIN) capsule 200 mg  200 mg Oral TID    famotidine (PEPCID) 20 MG/2ML 20 mg in sodium chloride (PF) 0.9 % 10 mL injection  20 mg IntraVENous Q12H    HYDROcodone homatropine (HYCODAN) 5-1.5 MG/5ML solution 5 mL  5 mL Oral Q4H PRN    amLODIPine (NORVASC) tablet 5 mg  5 mg Oral Daily    ondansetron (ZOFRAN) injection 4 mg  4 mg IntraVENous Q4H PRN    promethazine (PHENERGAN) tablet 25 mg  25 mg Oral Q6H PRN    hyoscyamine (LEVSIN/SL) sublingual tablet 0.125 mg  0.125 mg SubLINGual Q8H PRN    arformoterol tartrate (BROVANA) nebulizer solution 15 mcg  15 mcg Nebulization BID RT    sodium chloride (Inhalant) 3 % nebulizer solution 4 mL  4 mL Nebulization BID    budesonide (PULMICORT) nebulizer suspension 500 mcg  0.5 mg Nebulization BID RT    linaCLOtide (LINZESS) capsule 72 mcg (Patient Supplied)  72 mcg Oral QAM AC    morphine injection

## 2025-07-19 NOTE — PROGRESS NOTES
SPEECH LANGUAGE PATHOLOGY: ATTEMPT     NAME: Xi Hill  : 1965  MRN: [id number]    ADMISSION DATE: 2025  PRIMARY DIAGNOSIS: Sepsis (HCC)    Speech Therapy attempted. Patient on BIPAP. Not appropriate for PO intake at this time per COLEEN Olson. Speech therapy will continue to follow as indicated, thank you!      JETT PATEL  2025 8:20 AM

## 2025-07-19 NOTE — PROGRESS NOTES
ICU PROGRESS NOTE           7/19/2025    Formerly [facility]                        Date of Admission:  7/17/2025      Patient is a 60 y.o.  female seen and evaluated at the request of Dr. Sanz for respiratory failure and pneumonia w/ near whiteout of right hemithorax. She has a PMH of colon cancer w/ mets to lung and primary lung cancer (sees [facility] oncology, recently stopped immunotherapy d/t side effects).     She was brought to [facility] by her  w/ SOB, non productive cough, and fatigue. He states he came home to find her semi conscious and in respiratory failure w/ sats in the 70s. She was recently admitted at [facility] from 7/5-7/8 w/ a cavitary pneumonia and was discharged on cefpodoxime, levaquin, and decadron. Upon arrival she was tachycardic, tachypneic, w/ SpO2 78% on RA. She was placed on 5L w/ SpO2 improvement to 94%. Notable labs: WBC 22.5 w/ L shift, Hgb 10.9, procal 0.36, Lactic 2.2, Na 131, albumin 1.8. CT chest was negative for PE but showed innumerable masses and consolidation w/ interval cavitation. Sepsis criteria was met and bundle was completed in the ED, blood cultures are pending, covid swab was negative. She was started on vanc and zosyn and and she was transferred downtown for high risk of decompensation.     Subjective:   Required NIPPV due to worsening hypercapnia yesterday       Review of Systems: Comprehensive ROS negative except in HPI    Current Outpatient Medications   Medication Instructions    albuterol sulfate HFA (PROVENTIL;VENTOLIN;PROAIR) 108 (90 Base) MCG/ACT inhaler 2 puffs, EVERY 6 HOURS PRN    albuterol sulfate HFA (VENTOLIN HFA) 108 (90 Base) MCG/ACT inhaler 2 puffs, Inhalation, 4 TIMES DAILY PRN    amLODIPine (NORVASC) 5 mg, Oral, DAILY    brompheniramine-pseudoephedrine-DM 2-30-10 MG/5ML syrup 7.5 mLs, EVERY 6 HOURS PRN    dexAMETHasone (DECADRON) 4 mg, 2 TIMES DAILY    diclofenac sodium (VOLTAREN) 1-2 g, 3 TIMES DAILY

## 2025-07-19 NOTE — PROGRESS NOTES
07/19/25 1956   NIV Type   NIV Started/Stopped On   Equipment Type v60   Mode Bilevel   Mask Type Under the nose   Mask Size Medium   Bonnet size Medium   Assessment   Pulse (!) 126   Respirations (!) 33   SpO2 (!) 84 %   Comfort Level Good   Using Accessory Muscles No   Mask Compliance Good   Skin Assessment Clean, dry, & intact   Breath Sounds   Respiratory Pattern Regular   Breath Sounds Bilateral Rhonchi   Settings/Measurements   PIP Observed 19 cm H20   IPAP 18 cmH20   CPAP/EPAP 10 cmH2O   Vt (Measured) 466 mL   Rate Ordered 18   Insp Rise Time (%) 3 %   FiO2  70 %   I Time/ I Time % 0.8 s   Minute Volume (L/min) 10.3 Liters   Patient's Home Machine No   Alarm Settings   Alarms On Y   Low Pressure (cmH2O) 5 cmH2O   High Pressure (cmH2O) 35 cmH2O   Apnea (secs) 20 secs   RR Low (bpm) 10   RR High (bpm) 50 br/min

## 2025-07-19 NOTE — PLAN OF CARE
Problem: Respiratory - Adult  Goal: Achieves optimal ventilation and oxygenation  7/19/2025 0818 by Kim Zarco RCP  Flowsheets (Taken 7/19/2025 0818)  Achieves optimal ventilation and oxygenation:   Assess for changes in respiratory status   Position to facilitate oxygenation and minimize respiratory effort   Respiratory therapy support as indicated   Assess for changes in mentation and behavior   Oxygen supplementation based on oxygen saturation or arterial blood gases   Encourage broncho-pulmonary hygiene including cough, deep breathe, incentive spirometry   Assess and instruct to report shortness of breath or any respiratory difficulty

## 2025-07-20 NOTE — PROCEDURES
Procedure: Emergent intubation (CPT 37616)    Indication: acute respiratory failure with hypoxia and hypercarbia    Anesthesia:  Fentanyl 50mcg, Etomidate 0.3mg/kg, Succinylcholine 1.5mg/kg     After assessing the airway, the patient underwent preoxygenation with 100% FiO2 for 5 min. Fentanyl and etomidate were given sequentially.  After adequate sedation intubation was performed a 3.0 MAC blade laryngoscope was used to visualize the epiglottis and vocal chords.    After positive identification of the vocal chords, a 8.0 ET tube was placed into the trachea with direct visualization.    The tube was seen passing through the vocal chords without difficulty.  CO2 colorimetry was employed immediately to verify tube in airway with appropriate color change indicating detection of CO2.    Water vapor was seen within the ET tube, and auscultation of the abdomen revealed no bubbling sounds.    Auscultation and inspection of the chest after intubation showed symmetric chest excursion and symmetric air entry bilaterally.    The tube was secured at 23cm at the lip.    Chest X-ray has been ordered and is pending.    The patient has been placed on a mechanical ventilator.    There were no complications.    Zaira Mcfadden MD

## 2025-07-20 NOTE — PROGRESS NOTES
Spiritual Health Progress Note  [facility]      Room # 3306/01    Name: Xi Hill           Age: 60 y.o.    Gender: female          MRN: [id number]  Zoroastrian: Other       Preferred Language: English      Date: 07/20/25  Visit Time: Begin Time: 1500 End Time : 1510  Complexity of Encounter: Moderate      Visit Summary:  met with family of patient in the [place]entes as she was lost and needed direction to get to her loved one.  As I was escorting the Granddaughter to the room, she was very emotional. As I got closer to the room, I witness a room full of people and they were very sad and tearful.  I said a prayer standing outside of the room and then as I was leaving, I went by the waiting room and there was about 10 more people in there for this same patient.  I asked them if there is anything that I can do for them or help them with spiritually and they said just continue to pray.  did find out as to speaking with the Family, that they are a strong yumiko based Family.     Referral/Consult From: Rounding  Encounter Overview/Reason: Initial Encounter  Encounter Code:     Crisis (if applicable):    Service Provided For: Family     Patient was not available. Patient was unable to communicate.    Yumiko, Belief, Meaning:   Patient unable to assess at this time  Family/Friends Yes  Rituals (if applicable) Type: Blessings    Importance and Influence:  Patient unable to assess at this time  Family/Friends has spiritual/personal beliefs that influence decisions regarding the patient's health    Community:  Patient   unable to assess at this time   Family/Friends   is connected with a spiritual community  indicated that they feel well-supported    Assessment and Plan of Care:   Emotions Expressed by Patient:   Assessment: Concerns with suffering, Decisional conflict, Despair, Fearful, Tearful    Interventions by :   Intervention: Active listening, Prayer (assurance of)/[place], Nurtured Hope

## 2025-07-20 NOTE — PROGRESS NOTES
Hospitalist Progress Note   Admit Date:  2025  1:33 PM   Name:  Xi Hill   Age:  60 y.o.  Sex:  female  :  1965   MRN:  [id number]   Room:  [address] [place] [address]    Presenting/Chief Complaint: No chief complaint on file.     Reason(s) for Admission: Sepsis (HCC) [A41.9]     Hospital Course:   Xi Hill is a 60 y.o. 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  Patient was recently seen by her oncologist about a week ago at [facility] ([facility]).  Patient was evaluated in clinic on 7/10, plan was to restart her systemic anticancer therapy for second cycle on  with regorafenib, in view of patient's significant tachycardia and worsening dyspnea decision was made to hold anticancer medication.  Recommendations were made for patient to get transitioned from full code to DNR/DNI and to opt hospice however patient declined that approach.  Bedside ultrasound yesterday showed small to moderate size right pleural effusion.  Vital signs and presentation were notable for pulse 133, respiratory rate 34, /106. Initial SPO2 was 78% on room air, after which she was quickly placed on 5 L O2 BNC with improvement in SPO2 to 94%. CMP was notable for sodium 131, albumin 1.8. Lactic acid was 2.2. CBC pertinent for WBC 22.5 with left shift, hemoglobin 10.9 with normocytic indices. ER provider ordered DuoNeb treatment, IV Zosyn, IV vancomycin, 1 L normal saline bolus x 2, and requested hospitalist.    Of note, she was hospitalized at [facility] from  through 2025 for cavitary pneumonia. The body of the discharge summary is  metastasis with pathological fracture involving superior endplate of L1.    Our oncology group has met with the patient and her spouse, also recommended to proceed with hospice care as patient is not a candidate for any active cancer treatment.  Palliative care on board as well      Hypoalbuminemia  Plan: Albumin 1.8    Continue other home medication including amlodipine for hypertension.  Continue Neurontin 200 mg p.o. 3 times daily for pain.  Continue Linzess.  For now continue clear liquid diet pending speech eval.      Anticipated Discharge Arrangements:   Too early to determine.    PT/OT evals ordered?  Therapy evals ordered  Diet:  ADULT DIET; Full Liquid  ADULT ORAL NUTRITION SUPPLEMENT; Lunch, Breakfast, Dinner; Standard High Calorie/High Protein Oral Supplement  VTE prophylaxis: Lovenox  Code status: Full Code    Patient at risk for further deterioration from acute respiratory failure with hypoxia and hypercapnia requiring NIV with high oxygen requirement.    Patient required critical care interventions including NIV with plans to intubate.    Total critical care time spent: 35 minutes    Critical care time includes time spent at bedside performing history and exam, performing chart review, discussing findings and treatment plan with patient and/or family, discussing patient with consultants and colleagues, ordering and reviewing pertinent laboratory and radiographic evaluations, and discussing patient with nursing staff. Time excludes procedures.      Non-peripheral Lines and Tubes (if present):      External Urinary Catheter (Active)        Telemetry (if present):  Cardiac/Telemetry Monitor On: Bedside monitor in use        Hospital Problems:  Principal Problem:    Sepsis (HCC)  Active Problems:    Acute respiratory failure with hypoxia (HCC)    Rectosigmoid cancer with metastases (HCC)    Hypoalbuminemia    Pleural effusion on right    Colon cancer metastasized to lung (HCC)    Shortness of breath

## 2025-07-20 NOTE — PROGRESS NOTES
Ventilator check complete; patient has a #8.0 ET tube secured at the 23 at the lip.  Patient is  sedated.  Patient is not able to follow commands.  Breath sounds are diminished.  Trachea is midline, Negative for subcutaneous air, and chest excursion is symmetric. Patient is also Negative for cyanosis and is Negative for pitting edema.  All alarms are set and audible.  Resuscitation bag is  at the head of the bed.      Ventilator Settings  Mode FIO2 Rate Tidal Volume Pressure PEEP I:E Ratio   AC/PRVC  (S) 90 % (found on 90%) 24     380    9   1:1.9      Peak airway pressure:   34  Minute ventilation:   10      VIV MARAVILLA RCP

## 2025-07-20 NOTE — PROGRESS NOTES
Pt taken off BIPAP briefly so she could talk to MD and  about intubation wishes. Pt unable to talk. Pt back on BIPAP. Sat dropped to 77%. Pt on 95% FIO2.  will talk with family about pt condition and possible intubation.

## 2025-07-20 NOTE — PROGRESS NOTES
SPEECH LANGUAGE PATHOLOGY: ATTEMPT     NAME: Xi Hill  : 1965  MRN: [id number]    ADMISSION DATE: 2025  PRIMARY DIAGNOSIS: Sepsis (HCC)    Speech Therapy attempted. RN reports patient not appropriate for po trials at this time and will ask MD team to re-consult when appropriate.     Thank you,  Rachael Ludwig, JETT  2025 10:02 AM

## 2025-07-20 NOTE — PLAN OF CARE
Problem: Discharge Planning  Goal: Discharge to home or other facility with appropriate resources  Outcome: Not Progressing  Flowsheets (Taken 7/20/2025 0715)  Discharge to home or other facility with appropriate resources: Identify barriers to discharge with patient and caregiver     Problem: Respiratory - Adult  Goal: Achieves optimal ventilation and oxygenation  7/20/2025 1124 by Omer Truong RN  Outcome: Not Progressing  7/20/2025 0746 by Kim Zarco RCP  Outcome: Progressing  Flowsheets  Taken 7/20/2025 0746 by Kim Zarco RCP  Achieves optimal ventilation and oxygenation:   Assess for changes in respiratory status   Position to facilitate oxygenation and minimize respiratory effort   Respiratory therapy support as indicated   Assess for changes in mentation and behavior   Oxygen supplementation based on oxygen saturation or arterial blood gases   Encourage broncho-pulmonary hygiene including cough, deep breathe, incentive spirometry   Assess and instruct to report shortness of breath or any respiratory difficulty  Taken 7/20/2025 0715 by Omer Truong RN  Achieves optimal ventilation and oxygenation:   Assess for changes in respiratory status   Assess for changes in mentation and behavior   Position to facilitate oxygenation and minimize respiratory effort   Oxygen supplementation based on oxygen saturation or arterial blood gases

## 2025-07-20 NOTE — PROGRESS NOTES
Ventilator check complete; patient has a #8.0 ET tube secured at the 23 at the lip.  Patient is  sedated.  Patient is not able to follow commands.  Breath sounds are rhonchi.  Trachea is midline, Negative for subcutaneous air, and chest excursion is symmetric. Patient is also Negative for cyanosis and is Negative for pitting edema.  All alarms are set and audible.  Resuscitation bag is  at the head of the bed.      Ventilator Settings  Mode FIO2 Rate Tidal Volume Pressure PEEP I:E Ratio   AC/PRVC  90 % (Weaned from 100%)   24   400      9 1:1.9      Peak airway pressure:   33  Minute ventilation:   10.2    Patient intubated at 1153.    ABG: No results for input(s): \"PH\", \"PCO2\", \"PO2\", \"HCO3\" in the last 72 hours.      BRENDAN BULL RCP

## 2025-07-20 NOTE — PROGRESS NOTES
Pt's family at bedside. Talking with pt about possible intubation. Pt has nodded her head \"yes\" to being intubated. Dr Mcfadden updated.

## 2025-07-20 NOTE — PROGRESS NOTES
ICU PROGRESS NOTE           7/20/2025    [facility]                        Date of Admission:  7/17/2025      Patient is a 60 y.o.  female seen and evaluated at the request of Dr. Sanz for respiratory failure and pneumonia w/ near whiteout of right hemithorax. She has a PMH of colon cancer w/ mets to lung and primary lung cancer (sees [facility] oncology, recently stopped immunotherapy d/t side effects).     She was brought to [facility] by her  w/ SOB, non productive cough, and fatigue. He states he came home to find her semi conscious and in respiratory failure w/ sats in the 70s. She was recently admitted at [facility] from 7/5-7/8 w/ a cavitary pneumonia and was discharged on cefpodoxime, levaquin, and decadron. Upon arrival she was tachycardic, tachypneic, w/ SpO2 78% on RA. She was placed on 5L w/ SpO2 improvement to 94%. Notable labs: WBC 22.5 w/ L shift, Hgb 10.9, procal 0.36, Lactic 2.2, Na 131, albumin 1.8. CT chest was negative for PE but showed innumerable masses and consolidation w/ interval cavitation. Sepsis criteria was met and bundle was completed in the ED, blood cultures are pending, covid swab was negative. She was started on vanc and zosyn and and she was transferred downtown for high risk of decompensation.     Subjective:   Required NIPPV due to worsening hypercapnia yesterday       Review of Systems: Comprehensive ROS negative except in HPI    Current Outpatient Medications   Medication Instructions    albuterol sulfate HFA (PROVENTIL;VENTOLIN;PROAIR) 108 (90 Base) MCG/ACT inhaler 2 puffs, EVERY 6 HOURS PRN    albuterol sulfate HFA (VENTOLIN HFA) 108 (90 Base) MCG/ACT inhaler 2 puffs, Inhalation, 4 TIMES DAILY PRN    amLODIPine (NORVASC) 5 mg, Oral, DAILY    brompheniramine-pseudoephedrine-DM 2-30-10 MG/5ML syrup 7.5 mLs, EVERY 6 HOURS PRN    dexAMETHasone (DECADRON) 4 mg, 2 TIMES DAILY    diclofenac sodium (VOLTAREN) 1-2 g, 3 TIMES DAILY

## 2025-07-20 NOTE — PLAN OF CARE
Problem: Respiratory - Adult  Goal: Achieves optimal ventilation and oxygenation  7/20/2025 0746 by Kim Zarco RCP  Outcome: Progressing  Flowsheets (Taken 7/20/2025 0746)  Achieves optimal ventilation and oxygenation:   Assess for changes in respiratory status   Position to facilitate oxygenation and minimize respiratory effort   Respiratory therapy support as indicated   Assess for changes in mentation and behavior   Oxygen supplementation based on oxygen saturation or arterial blood gases   Encourage broncho-pulmonary hygiene including cough, deep breathe, incentive spirometry   Assess and instruct to report shortness of breath or any respiratory difficulty

## 2025-07-20 NOTE — PLAN OF CARE
Problem: Discharge Planning  Goal: Discharge to home or other facility with appropriate resources  Outcome: Not Progressing     Problem: Respiratory - Adult  Goal: Achieves optimal ventilation and oxygenation  7/19/2025 2115 by Yael Robertson RN  Outcome: Not Progressing  7/19/2025 0818 by Kim Zarco RCP  Flowsheets (Taken 7/19/2025 0818)  Achieves optimal ventilation and oxygenation:   Assess for changes in respiratory status   Position to facilitate oxygenation and minimize respiratory effort   Respiratory therapy support as indicated   Assess for changes in mentation and behavior   Oxygen supplementation based on oxygen saturation or arterial blood gases   Encourage broncho-pulmonary hygiene including cough, deep breathe, incentive spirometry   Assess and instruct to report shortness of breath or any respiratory difficulty     Problem: Musculoskeletal - Adult  Goal: Return ADL status to a safe level of function  Outcome: Not Progressing     Problem: Gastrointestinal - Adult  Goal: Maintains adequate nutritional intake  Outcome: Not Progressing     Problem: Coping  Goal: Pt/Family able to verbalize concerns and demonstrate effective coping strategies  Description: INTERVENTIONS:  1. Assist patient/family to identify coping skills, available support systems and cultural and spiritual values  2. Provide emotional support, including active listening and acknowledgement of concerns of patient and caregivers  3. Reduce environmental stimuli, as able  4. Instruct patient/family in relaxation techniques, as appropriate  5. Assess for spiritual pain/suffering and initiate Spiritual Care, Psychosocial Clinical Specialist consults as needed  7/19/2025 2115 by Yael Robertson RN  Outcome: Not Progressing  7/19/2025 1715 by Romulo Vickers, RN  Outcome: Progressing

## 2025-07-20 NOTE — PROGRESS NOTES
Followed up with patient. Reviewed chart including both [facility] and [facility] oncology services recommending hospice for metastatic colon and lung cancer no longer tolerating therapy and advancing to fulminant respiratory failure. No response to antibiotics or diuresis. Palliative and intensivist service have recommended DNR/Comfort care.     Family at bedside discussing situation and plan. Sign out from Dr. Machuca was that she was hesitant to do intubation, but son at bedside says she had agreed and they discussed with her again and she apparently has agreed to intubation. I did discuss with them that intubation will not change her outcome and unfortunately they will need to prepare themselves for palliative extubation when she doesn't improve. They are convinced she will get better. She is having continued dyspnea, sats 89% on BIPAP and mild tachypnea. Will proceed with intubation. Ongoing palliative care discussions. Remains Full code.     Christiano Mcfadden MD

## 2025-07-21 PROBLEM — J96.01 ACUTE HYPOXEMIC RESPIRATORY FAILURE (HCC): Status: ACTIVE | Noted: 2025-01-01

## 2025-07-21 PROBLEM — A41.9 SEPTIC SHOCK (HCC): Status: ACTIVE | Noted: 2025-01-01

## 2025-07-21 PROBLEM — R65.21 SEPTIC SHOCK (HCC): Status: ACTIVE | Noted: 2025-01-01

## 2025-07-21 NOTE — PROGRESS NOTES
Nutrition Assessment  Assessment Type: Reassess  Reason for visit:  Best Practice Alert: Malnutrition Screening Tool   Malnutrition Screening Tool Score: 2  Unintentional weight loss PTA: 2 to 13 pounds (1 point)  Eating poorly due to decreased appetite: Yes (1 point)    Nutrition Intervention:   Food and/or Nutrient Delivery:   Meals and Snacks:  Diet: NPO  Medical Food Supplements:   Medical food supplement therapy:  None Ensure Clear (clear liquid oral supplement) 240 calories, 8 grams protein per 8 ounce serving and Deferred NPO  Consult RD for tube feeding if in line with goals of care.  OGT in place.     Malnutrition Assessment:  Malnutrition Status: Insufficient data (unable to complete NFPE. Unable to obtain nutrition hx.)    Nutrition Focused Physical Exam: Deferred at this time    Nutrition Assessment:  Food/Nutrition Related History: Unable to obtain at this time.      Do You Have Any Cultural, Tenriism, or Ethnic Food Preferences?: No   Weight History: No weight hx available.   CBW: 181# (7/18- bed scale)  Nutrition Background:       PMH: acute respiratory failure with hypoxia, rectosigmoid cancer with mets to lung 2025, NSCLC s/p lobectomy and chemo 2023, pleural effusion on R, SOB, anxiety. Recently discharged form [facility] for sepsis due to cavitary pneumonia. She was found semiconscious in respiratory distress by . Admitted with pneumonia and severe sepsis to [facility]. Transferred to D. Additional findings of R pleural effusion.   7/20 intubated  Nutrition Monitoring/Evaluation:  Discussed with RNs Madelaine and Kareen. [facility] conversations regarding comfort measure today with Dr. Vazquez. Asked not to see family now.     Current Nutrition Therapies:  Diet NPO    Current Intake:   Average Meal Intake: NPO        Anthropometric Measures:  Height: 167.6 cm (5' 5.98\")  Current Body Wt: 82.3 kg (181 lb 7 oz) (7/20), Weight source: Bed scale  BMI: 29.3, Overweight (BMI 25.0-29.9)  Admission Body Weight: 81.4

## 2025-07-21 NOTE — PROGRESS NOTES
Spiritual Health Critical Care Progress Note  [facility]      Room # 3306/01    Name: Xi Hill           Age: 60 y.o.    Gender: female          MRN: [id number]  Jain: Sikh       Preferred Language: English      Date: 07/21/25  Visit Time: Begin Time: 1120 End Time : 1130  Complexity of Encounter: Moderate      Visit Summary:  visited patient in CCU after attending IDR to assess spiritual health needs. Sister was at bedside at time, while other family had gone out for a break she stated. Family has been supportive of each other. Patient is intubated and appeared to be resting at the time.  Sister engaged in life review and shared prayer with .  provided pastoral presence, prayer and empathetic listening.  will continue to follow patient in ICU and assess further support.      Referral/Consult From: Rounding  Encounter Overview/Reason: Follow-up  Service Provided For: Patient and family together     Patient was available.    Yumiko, Belief, Meaning:   Patient identifies as spiritual  is connected with a yumiko tradition or spiritual practice  has beliefs or practices that help with coping during difficult times  Family/Friends identifies as spiritual  are connected with a yumiko tradition or spiritual practice  have beliefs or practices that help with coping during difficult times    Importance and Influence:  Patient does not have spiritual/personal beliefs that influence decisions regarding their health  Family/Friends does not have spiritual/personal beliefs that influence decisions regarding the patient's health    Community:  Patient   Support System Includes   Spouse, Children, Family members, Yarsanism/yumiko community   Family/Friends   indicated that they feel well-supported  Support System Includes   Spouse, Children, Family members, Yarsanism/yumiko community     Assessment and Plan of Care:   Emotions Expressed by Patient:   Assessment: Unable to

## 2025-07-21 NOTE — INTERDISCIPLINARY ROUNDS
Multi-D Rounds/Checklist (leapfrog):  Lines: can any be removed?: None   ETT  (Active)     Urinary Catheter 07/20/25 [place];[address] (Active)     Implantable Port Right Subclavian (Active)     DVT Prophylaxis: Ordered  Vent: HOB elevated? Yes ;  mL/kg: N/A ; Vent day 2  Nutrition Ordered/appropriate: Ordered  Can antibiotics or other drugs be stopped? Yes/End Date set Yes/No  MRSA swab:   Inpat Anti-Infectives (From admission, onward)       Start     Ordered Stop    07/17/25 1600  linezolid (ZYVOX) IVPB 600 mg  600 mg,   IntraVENous,   EVERY 12 HOURS         07/17/25 1438 07/24/25 1559    07/17/25 1400  piperacillin-tazobactam (ZOSYN) 3,375 mg in sodium chloride 0.9 % 50 mL IVPB (addEASE)  3,375 mg,   IntraVENous,   EVERY 8 HOURS         07/17/25 1351 --                  Consults needed: None  A: Is pain control adequate? (has PRNs? Stop drip?) Yes  B: Sedation break and SBT? Yes  C: Is sedation choice appropriate? Yes  D: Delirium/CAM-ICU? No  E: Mobility goals/appropriateness? Yes  F: Family update and plan? Wife is surrogate decision maker and is being updated daily by primary attending and nursing staff.    CARLOS Garner

## 2025-07-21 NOTE — CARE COORDINATION
Chart reviewed and pt discussed in am IDR. Now intubated/vent -90% fio2. Precedex, fentanyl, propofol gtt.     Followed by Intensivist.    Prognosis poor per MD. CM following for any assist. LOS 4 days.

## 2025-07-21 NOTE — PROGRESS NOTES
ICU PROGRESS NOTE           7/21/2025    [facility]                        Date of Admission:  7/17/2025      Patient is a 60 y.o.  female seen and evaluated at the request of Dr. Sanz for respiratory failure and pneumonia w/ near whiteout of right hemithorax. She has a PMH of colon cancer w/ mets to lung and primary lung cancer (sees [facility] oncology, recently stopped immunotherapy d/t side effects).     She was brought to [facility] by her  w/ SOB, non productive cough, and fatigue. He states he came home to find her semi conscious and in respiratory failure w/ sats in the 70s. She was recently admitted at [facility] from 7/5-7/8 w/ a cavitary pneumonia and was discharged on cefpodoxime, levaquin, and decadron. Upon arrival she was tachycardic, tachypneic, w/ SpO2 78% on RA. She was placed on 5L w/ SpO2 improvement to 94%. Notable labs: WBC 22.5 w/ L shift, Hgb 10.9, procal 0.36, Lactic 2.2, Na 131, albumin 1.8. CT chest was negative for PE but showed innumerable masses and consolidation w/ interval cavitation. Sepsis criteria was met and bundle was completed in the ED, blood cultures are pending, covid swab was negative. She was started on vanc and zosyn and and she was transferred downtown for high risk of decompensation.     Subjective:   On Vent since yesterday and oxygen needs dropping to 80's now. Tried suctioning and repositioning and not helping.  at bedside. Sedated on vent at this time.       Review of Systems: Comprehensive ROS negative except in HPI    Current Outpatient Medications   Medication Instructions    albuterol sulfate HFA (PROVENTIL;VENTOLIN;PROAIR) 108 (90 Base) MCG/ACT inhaler 2 puffs, EVERY 6 HOURS PRN    albuterol sulfate HFA (VENTOLIN HFA) 108 (90 Base) MCG/ACT inhaler 2 puffs, Inhalation, 4 TIMES DAILY PRN    amLODIPine (NORVASC) 5 mg, Oral, DAILY    brompheniramine-pseudoephedrine-DM 2-30-10 MG/5ML syrup 7.5 mLs, EVERY 6 HOURS PRN     true     Ran Out of Food in the Last Year: Never true   Transportation Needs: No Transportation Needs (7/17/2025)    PRAPARE - Transportation     Lack of Transportation (Medical): No     Lack of Transportation (Non-Medical): No   Physical Activity: Not on file   Stress: Not on file   Social Connections: At Risk (7/7/2025)    Received from [facility]     If for any reason you need help with day-to-day activities such as bathing, preparing meals, shopping, managing finances, etc., do you get the help you need?: I could use a little more help     How often do you feel lonely or isolated from those around you?: Never   Intimate Partner Violence: Not on file   Housing Stability: Low Risk  (7/17/2025)    Housing Stability Vital Sign     Unable to Pay for Housing in the Last Year: No     Number of Times Moved in the Last Year: 1     Homeless in the Last Year: No     No family history on file.  Allergies   Allergen Reactions    Morphine Hallucinations     Objective:   Blood pressure 105/72, pulse (!) 120, temperature 99.5 °F (37.5 °C), temperature source Axillary, resp. rate 25, height 1.676 m (5' 5.98\"), weight 82.3 kg (181 lb 7 oz), SpO2 (!) 88%.   Intake/Output Summary (Last 24 hours) at 7/21/2025 0753  Last data filed at 7/21/2025 0702  Gross per 24 hour   Intake 1584.11 ml   Output 750 ml   Net 834.11 ml     PHYSICAL EXAM   Constitutional:  the patient is well developed on NIPPV   EENMT:  Sclera clear, pupils equal, oral mucosa moist  Respiratory: symmetric chest rise. Slight wheezing bilaterally w/ crackles throughout R side and LLL. On 10L, tachypneic.   Cardiovascular:  Regular rhythm, tachycardic without M,G,R. There is no lower extremity edema.  Gastrointestinal: soft and non-tender; with positive bowel sounds.  Musculoskeletal: warm without cyanosis. Normal muscle tone.   Skin:  no jaundice or rashes, no obvious wounds   Neurologic: symmetric strength, fluent speech  Psychiatric:

## 2025-07-22 NOTE — INTERDISCIPLINARY ROUNDS
Multi-D Rounds/Checklist (leapfrog):  Lines: can any be removed?: None   ETT  (Active)     NG/OG/NJ/NE Tube Left mouth (Active)       Urinary Catheter 07/20/25 [place] (Active)     Implantable Port Right Subclavian (Active)     DVT Prophylaxis: Ordered  Vent: HOB elevated? Yes ;  mL/kg: N/A ; Vent day 3  Nutrition Ordered/appropriate: Ordered  Can antibiotics or other drugs be stopped? Yes/End Date set Yes/No  MRSA swab:   Inpat Anti-Infectives (From admission, onward)       Start     Ordered Stop    07/17/25 1600  linezolid (ZYVOX) IVPB 600 mg  600 mg,   IntraVENous,   EVERY 12 HOURS         07/17/25 1438 07/24/25 1559    07/17/25 1400  piperacillin-tazobactam (ZOSYN) 3,375 mg in sodium chloride 0.9 % 50 mL IVPB (addEASE)  3,375 mg,   IntraVENous,   EVERY 8 HOURS         07/17/25 1351 --                  Consults needed: None  A: Is pain control adequate? (has PRNs? Stop drip?) Yes  B: Sedation break and SBT? Yes  C: Is sedation choice appropriate? Yes  D: Delirium/CAM-ICU? No  E: Mobility goals/appropriateness? Yes  F: Family update and plan? Wife is surrogate decision maker and is being updated daily by primary attending and nursing staff.    CARLOS Garner

## 2025-07-22 NOTE — PROGRESS NOTES
Spoke with family and now comfort care. They will talk to each other and then likely withdraw care today. Will start with stopping pressors and then extubate. Will await them to clarify when. Stop all other meds/treatments expect comfort meds.   Time spent was additional 22 minutes. All questions answered for  and patient's children - 3.  Julio Vazquez MD

## 2025-07-22 NOTE — PROGRESS NOTES
Ventilator check complete; patient has a #8.0 ET tube secured at the 23 at the lip.  Patient is  sedated.  Patient is not able to follow commands.  Breath sounds are coarse and diminished.  Trachea is midline, Negative for subcutaneous air, and chest excursion is symmetric. Patient is also Negative for cyanosis and is Negative for pitting edema.  All alarms are set and audible.  Resuscitation bag is  at the head of the bed.      Ventilator Settings  Mode FIO2 Rate Tidal Volume Pressure PEEP I:E Ratio   AC/PRVC  100 %   30   300      10 1.4:1          MAYRA SUMMERS RCP

## 2025-07-22 NOTE — PROGRESS NOTES
ICU PROGRESS NOTE           7/22/2025    [facility]                        Date of Admission:  7/17/2025      Patient is a 60 y.o.  female seen and evaluated at the request of Dr. Sanz for respiratory failure and pneumonia w/ near whiteout of right hemithorax. She has a PMH of colon cancer w/ mets to lung and primary lung cancer (sees [facility] oncology, recently stopped immunotherapy d/t side effects).     She was brought to [facility] by her  w/ SOB, non productive cough, and fatigue. He states he came home to find her semi conscious and in respiratory failure w/ sats in the 70s. She was recently admitted at [facility] from 7/5-7/8 w/ a cavitary pneumonia and was discharged on cefpodoxime, levaquin, and decadron. Upon arrival she was tachycardic, tachypneic, w/ SpO2 78% on RA. She was placed on 5L w/ SpO2 improvement to 94%. Notable labs: WBC 22.5 w/ L shift, Hgb 10.9, procal 0.36, Lactic 2.2, Na 131, albumin 1.8. CT chest was negative for PE but showed innumerable masses and consolidation w/ interval cavitation. Sepsis criteria was met and bundle was completed in the ED, blood cultures are pending, covid swab was negative. She was started on vanc and zosyn and and she was transferred downtown for high risk of decompensation.     Subjective:   On Vent since yesterday and saturation down to 70's despite adding in [place]. Pressors going up.  at bedside.       Review of Systems: unable to obtain due to patient's condition.     Current Outpatient Medications   Medication Instructions    albuterol sulfate HFA (PROVENTIL;VENTOLIN;PROAIR) 108 (90 Base) MCG/ACT inhaler 2 puffs, EVERY 6 HOURS PRN    albuterol sulfate HFA (VENTOLIN HFA) 108 (90 Base) MCG/ACT inhaler 2 puffs, Inhalation, 4 TIMES DAILY PRN    amLODIPine (NORVASC) 5 mg, Oral, DAILY    brompheniramine-pseudoephedrine-DM 2-30-10 MG/5ML syrup 7.5 mLs, EVERY 6 HOURS PRN    dexAMETHasone (DECADRON) 4 mg, 2 TIMES  images.    7/22 vs 7/21 ETT noted and worseing infiltrates on left today. Right side is mostly alice out            7/21 - noted ETT in place with b/l diffuse lung lesion and Right sided completely alice out                        CT Chest: 7/17        Recent Labs     07/20/25  0452 07/21/25  0527 07/22/25  0336   WBC 25.2* 23.9* 20.9*   HGB 10.3* 9.4* 10.1*   HCT 34.1* 31.4* 32.7*    219 177   * 133* 132*   K 4.9 4.6 4.6   CL 96* 95* 96*   CO2 23 25 22   BUN 29* 39* 42*   CREATININE 0.97 1.17* 1.16*       Lab Results   Component Value Date/Time     07/22/2025 03:36 AM    K 4.6 07/22/2025 03:36 AM    CL 96 07/22/2025 03:36 AM    CO2 22 07/22/2025 03:36 AM    BUN 42 07/22/2025 03:36 AM    CREATININE 1.16 07/22/2025 03:36 AM    GLUCOSE 127 07/22/2025 03:36 AM    CALCIUM 9.5 07/22/2025 03:36 AM      No results found for: \"BNP\"    ECHO: No results found for this or any previous visit.    MICRO:   Recent Labs     07/20/25  1211   CULTURE NO GROWTH 2 DAYS       No results for input(s): \"COVID19\" in the last 72 hours.        Assessment and Plan:  (Medical Decision Making)   Impression: Mrs. 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  She saw her oncologist a week ago, who reported worsening dyspnea and functional status and suggested hospice be engaged.  She was unwilling to pursue this or change code status. On vent now and spoke with  this AM since desaturaton and on pressors. Told him she will not survive and to think about CPR. Today he agrees with no CPR. Will          NEURO:   Sedation:   precedex 1.2  Analgesia: Fentanyl 100  CV:   Shock -- on pressors -- likely septic and increasing up to 9 of levephed  PULM:   Acute hypoxemic/hypercapneic respiratory failure:  wose and now

## 2025-07-22 NOTE — PROGRESS NOTES
Spiritual Health Critical Care Progress Note  [facility]      Room # 3306/01    Name: Xi Hill           Age: 60 y.o.    Gender: female          MRN: [id number]  Church: Rastafari       Preferred Language: English      Date: 07/22/25  Visit Time: Begin Time: 0900 End Time : 0920  Complexity of Encounter: Moderate      Visit Summary:  visited patient and family in CCU after attending IDR to assess spiritual health needs, and receiving request from nurse.  spoke with  in waiting room and other family in bedside. Patient is intubated and did not respond at time. Family engaged in life review and reflected on their medical decisions.  provided pastoral presence, prayer and empathetic listening.  will continue to follow patient in ICU and assess further support.      Referral/Consult From: Nurse, Rounding  Encounter Overview/Reason: Follow-up  Service Provided For: Patient and family together     Patient was available.    Yumiko, Belief, Meaning:   Patient is connected with a yumiko tradition or spiritual practice  has beliefs or practices that help with coping during difficult times  yumiko/ spirituality is a source of strength  Family/Friends are connected with a yumiko tradition or spiritual practice  have beliefs or practices that help with coping during difficult times  yumiko/ spirituality is a source of strength    Importance and Influence:  Patient does not have spiritual/personal beliefs that influence decisions regarding their health  Family/Friends does not have spiritual/personal beliefs that influence decisions regarding the patient's health    Community:  Patient   is connected with a spiritual community  Support System Includes   Spouse, Family members, Children, [employer]/yumiko community   Family/Friends   is connected with a spiritual community  Support System Includes   Spouse, Family members, Children, [employer]/yumiko community     Assessment and  Plan of Care:   Emotions Expressed by Patient:   Assessment: Unable to assess    Interventions by :   Intervention: Prayer (assurance of)/[place], Sustaining Presence/Ministry of presence     Result/ Response by Patient:   Outcome: Did not respond    Patient Plan of Care:   Plan and Referrals  Plan/Referrals: Continue to visit, (comment)  Does the patient have a [facility] PCP?: No     Emotions Expressed by Spouse/Family/Friends:   grieving  sad  powerlessness  weary     Interventions with Spouse/ Family/Friends include:   active listening  facilitated expression of thoughts and feelings  prayer  provided ministry of presence  facilitated life review and/or legacy    Spouse/Family/Friends Plan of Care:   Spiritual care available upon referral.      Electronically signed by    Marita castillo with youChaplain Burak on 7/22/2025 at 10:06 AM.   Spiritual Health   [facility]  [phone]

## 2025-07-23 NOTE — PROGRESS NOTES
Terminal wean per MD. Patient extubated to RA at this time. No complications with extubation.     NOY SCHMIDT RCP

## 2025-07-23 NOTE — DEATH NOTES
Death Pronouncement Note  Patient's Name: Xi Hill   Patient's YOB: 1965  MRN Number: [id number]    Admitting Provider: Arvin Calle MD  Attending Provider: Zaira Mcfadden MD    Patient was examined and the following were absent: Pulses, Blood Pressure, and Respiratory effort    I declared the patient dead on 7/22/2025 at 8:07 PM    Preliminary Cause of Death: Respiratory failure (HCC)     Electronically signed by Arvin Calle MD on 7/22/25 at 8:34 PM EDT

## 2025-07-23 NOTE — PROGRESS NOTES
Family agrees to comfort care at this time. Norepinephrine gtt was stopped, RT notified for extubation. Patient still on Fentanyl and Precedex gtt at this time for comfort care measures.

## 2025-07-23 NOTE — DISCHARGE SUMMARY
Discharge Summary    Date: 7/22/2025  Patient Name: Xi Hill    YOB: 1965     Age: 60 y.o.    Admit Date: 7/17/2025  Discharge Date:  Discharge Condition:    Admission Diagnosis  Sepsis (HCC) [A41.9]      Discharge Diagnosis  Principal Problem:    Sepsis (HCC)  Active Problems:    Acute respiratory failure with hypoxia (HCC)    Rectosigmoid cancer with metastases (HCC)    Hypoalbuminemia    Pleural effusion on right    Colon cancer metastasized to lung (HCC)    Shortness of breath    Anxiety    Frailty    Encounter for palliative care    Metastasis to liver (HCC)    Primary malignant neoplasm of lung metastatic to other site (HCC)    Acute hypoxemic respiratory failure (HCC)    Septic shock (HCC)  Resolved Problems:    * No resolved hospital problems. *      Hospital Stay  Narrative of Hospital Course:  She was brought to [facility] by her  w/ SOB, non productive cough, and fatigue. He states he came home to find her semi conscious and in respiratory failure w/ sats in the 70s. She was recently admitted at [facility] from 7/5-7/8 w/ a cavitary pneumonia and was discharged on cefpodoxime, levaquin, and decadron. Upon arrival she was tachycardic, tachypneic, w/ SpO2 78% on RA. She was placed on 5L w/ SpO2 improvement to 94%. Notable labs: WBC 22.5 w/ L shift, Hgb 10.9, procal 0.36, Lactic 2.2, Na 131, albumin 1.8. CT chest was negative for PE but showed innumerable masses and consolidation w/ interval cavitation. Sepsis criteria was met     Patient was admitted to the CCU where she had multiple treatments for respiratory failure however her lung condition is terminal.  Patient was not able to be oxygenated secondary to cavitary lesions and sepsis.  Patient continued to deteriorate during her time in the CCU her SaO2 and PaO2 remained low and not compatible with life for several days.  Family decided to make her comfort measures only she was taken off the ventilator and kept comfortable  and passed quickly.    Consultants:  IP CONSULT TO CRITICAL CARE  IP CONSULT TO PHARMACY  IP CONSULT TO ONCOLOGY  IP CONSULT TO PALLIATIVE CARE  IP CONSULT TO PHARMACY    Surgeries/procedures Performed:      Treatments:            Discharge Plan/Disposition:      Hospital/Incidental Findings Requiring Follow Up:    Patient Instructions:    Diet:    Activity:  For number of days (if applicable):      Other Instructions:    Provider Follow-Up:   No follow-ups on file.     Significant Diagnostic Studies:    Recent Labs:  Admission on 2025  No results displayed because visit has over 200 results.    ------------    Radiology last 7 days:  XR CHEST PORTABLE  Result Date: 2025  Findings/impression: Mildly improved aeration at the periphery of the right lower lung with the balance of the examination appearing similar to the prior. Electronically signed by José Miguel England    XR CHEST PORTABLE  Result Date: 2025  Findings/impression: Accounting for the differences in technique and patient positioning likely no significant interval change compared to the prior. Electronically signed by José Miguel England    XR CHEST PORTABLE  Result Date: 2025  FINDINGS/IMPRESSION: Near complete opacification of the right hemithorax. Right chest wall kathia catheter is present with the tip terminating in the lower SVC. Patchy left-sided pulmonary opacities representing multiple masses with superimposed airspace disease. Endotracheal tube measures 5.9 cm above the glenn. Nasogastric tube terminates within the stomach. Left-sided double-J ureteral stent is present. Nonobstructive bowel gas pattern without evidence of free air or pneumatosis. Electronically signed by José Miguel Wallace MD    XR ABDOMEN (KUB) (SINGLE AP VIEW)  Result Date: 2025  FINDINGS/IMPRESSION: Near complete opacification of the right hemithorax. Right chest wall kathia catheter is present with the tip terminating in the lower SVC. Patchy

## 2025-07-24 NOTE — FLOWSHEET NOTE
Added to restraint log as death within 24 hours of being in bilateral soft wrist restraints.  7/24/2025  12:34pm.

## 2025-08-13 NOTE — PROGRESS NOTES
Physician Progress Note      PATIENT:               DREW ZURITA  [facility] #:                  [id number]  :                       1965  ADMIT DATE:       2025 1:33 PM  DISCH DATE:        2025 8:07 PM  RESPONDING  PROVIDER #:        Arvin Calle MD          QUERY TEXT:    Based on your medical judgment, please clarify the clinical significance of   this diagnosis.    The clinical indicators include:  59 y.o. female with metastatic lung adenocarcinoma (liver), metastatic colon   cancer (lung, nodes)     CT Chest:  Infiltrative soft tissue density in the mediastinum and hilar   regions, likely due to metastatic disease.   Pulmonology procedure: A small to moderate anechoic space was seen on the   right side consistent with an uncomplicated pleural effusion.  There was no   clear path to the pleural fluid due to peripheral lung tissue obstructing the   window.   Oncology consult: Pulm following - unable to perform thoracentesis  Options provided:  -- Mets to mediastinum clinically significant  -- Mets to mediastinum clinically insignificant  -- Other - I will add my own diagnosis  -- Disagree - Not applicable / Not valid  -- Disagree - Clinically unable to determine / Unknown  -- Refer to Clinical Documentation Reviewer    PROVIDER RESPONSE TEXT:    The diagnosis of mets to mediastinum is clinically significant.    Query created by: Sherrell Bustamante on 2025 3:16 PM      QUERY TEXT:    Cavitation is documented in the  CTA chest scan. Please provide additional   clinical indicators supportive of your documentation. Or please document if   the diagnosis of cavitation has been ruled out after study.    The clinical indicators include:  H&P \"She was hospitalized at [facility] from  through 2025 for cavitary   pneumonia. Per the document new prescriptions upon discharge were that of   cefpodoxime x 7 days, dexamethasone x 4 days, Norco, Levaquin 750 mg x 7 days,   Linzess, naloxone

## (undated) DEVICE — KIT THORCENT 8FR L5IN POLYUR W/ 18/22/25GA NDL 3 W STPCOCK

## (undated) DEVICE — STERILE POLYISOPRENE POWDER-FREE SURGICAL GLOVES: Brand: PROTEXIS